# Patient Record
Sex: FEMALE | Race: WHITE | Employment: UNEMPLOYED | URBAN - METROPOLITAN AREA
[De-identification: names, ages, dates, MRNs, and addresses within clinical notes are randomized per-mention and may not be internally consistent; named-entity substitution may affect disease eponyms.]

---

## 2017-09-21 ENCOUNTER — OFFICE VISIT (OUTPATIENT)
Dept: FAMILY MEDICINE CLINIC | Age: 27
End: 2017-09-21

## 2017-09-21 VITALS
TEMPERATURE: 98.5 F | OXYGEN SATURATION: 96 % | SYSTOLIC BLOOD PRESSURE: 92 MMHG | RESPIRATION RATE: 20 BRPM | HEIGHT: 62 IN | HEART RATE: 65 BPM | DIASTOLIC BLOOD PRESSURE: 64 MMHG | WEIGHT: 123.8 LBS | BODY MASS INDEX: 22.78 KG/M2

## 2017-09-21 DIAGNOSIS — Z86.19 H/O COLD SORES: ICD-10-CM

## 2017-09-21 DIAGNOSIS — Z51.81 THERAPEUTIC DRUG MONITORING: ICD-10-CM

## 2017-09-21 DIAGNOSIS — Z00.00 WELL ADULT HEALTH CHECK: Primary | ICD-10-CM

## 2017-09-21 DIAGNOSIS — Z72.0 TOBACCO ABUSE: ICD-10-CM

## 2017-09-21 PROCEDURE — 99385 PREV VISIT NEW AGE 18-39: CPT | Performed by: FAMILY MEDICINE

## 2017-09-21 RX ORDER — VALACYCLOVIR HYDROCHLORIDE 500 MG/1
500 TABLET, FILM COATED ORAL 2 TIMES DAILY
Qty: 60 TABLET | Refills: 2 | Status: SHIPPED | OUTPATIENT
Start: 2017-09-21 | End: 2018-05-03 | Stop reason: SDUPTHER

## 2017-09-21 ASSESSMENT — PATIENT HEALTH QUESTIONNAIRE - PHQ9
5. POOR APPETITE OR OVEREATING: 2
SUM OF ALL RESPONSES TO PHQ9 QUESTIONS 1 & 2: 4
1. LITTLE INTEREST OR PLEASURE IN DOING THINGS: 2
2. FEELING DOWN, DEPRESSED OR HOPELESS: 2
4. FEELING TIRED OR HAVING LITTLE ENERGY: 2
10. IF YOU CHECKED OFF ANY PROBLEMS, HOW DIFFICULT HAVE THESE PROBLEMS MADE IT FOR YOU TO DO YOUR WORK, TAKE CARE OF THINGS AT HOME, OR GET ALONG WITH OTHER PEOPLE: 3
3. TROUBLE FALLING OR STAYING ASLEEP: 0
7. TROUBLE CONCENTRATING ON THINGS, SUCH AS READING THE NEWSPAPER OR WATCHING TELEVISION: 2
SUM OF ALL RESPONSES TO PHQ QUESTIONS 1-9: 14
6. FEELING BAD ABOUT YOURSELF - OR THAT YOU ARE A FAILURE OR HAVE LET YOURSELF OR YOUR FAMILY DOWN: 2
8. MOVING OR SPEAKING SO SLOWLY THAT OTHER PEOPLE COULD HAVE NOTICED. OR THE OPPOSITE, BEING SO FIGETY OR RESTLESS THAT YOU HAVE BEEN MOVING AROUND A LOT MORE THAN USUAL: 2
9. THOUGHTS THAT YOU WOULD BE BETTER OFF DEAD, OR OF HURTING YOURSELF: 0

## 2017-09-21 ASSESSMENT — ENCOUNTER SYMPTOMS
CONSTIPATION: 0
COUGH: 0
DIARRHEA: 0
SHORTNESS OF BREATH: 0

## 2018-05-03 ENCOUNTER — OFFICE VISIT (OUTPATIENT)
Dept: FAMILY MEDICINE CLINIC | Age: 28
End: 2018-05-03

## 2018-05-03 VITALS
SYSTOLIC BLOOD PRESSURE: 108 MMHG | HEART RATE: 84 BPM | TEMPERATURE: 98.4 F | WEIGHT: 119.4 LBS | OXYGEN SATURATION: 98 % | DIASTOLIC BLOOD PRESSURE: 68 MMHG | RESPIRATION RATE: 18 BRPM | BODY MASS INDEX: 21.97 KG/M2 | HEIGHT: 62 IN

## 2018-05-03 DIAGNOSIS — J02.9 SORE THROAT: ICD-10-CM

## 2018-05-03 DIAGNOSIS — Z86.19 H/O COLD SORES: ICD-10-CM

## 2018-05-03 DIAGNOSIS — J02.9 ACUTE VIRAL PHARYNGITIS: Primary | ICD-10-CM

## 2018-05-03 LAB — S PYO AG THROAT QL: NORMAL

## 2018-05-03 PROCEDURE — 99213 OFFICE O/P EST LOW 20 MIN: CPT | Performed by: FAMILY MEDICINE

## 2018-05-03 PROCEDURE — 87880 STREP A ASSAY W/OPTIC: CPT | Performed by: FAMILY MEDICINE

## 2018-05-03 RX ORDER — QUETIAPINE FUMARATE 50 MG/1
50 TABLET, FILM COATED ORAL 2 TIMES DAILY
COMMUNITY
End: 2018-06-20 | Stop reason: SDUPTHER

## 2018-05-03 RX ORDER — VALACYCLOVIR HYDROCHLORIDE 500 MG/1
500 TABLET, FILM COATED ORAL DAILY
Qty: 90 TABLET | Refills: 3 | Status: SHIPPED | OUTPATIENT
Start: 2018-05-03 | End: 2018-06-20 | Stop reason: SDUPTHER

## 2018-05-03 ASSESSMENT — ENCOUNTER SYMPTOMS
SHORTNESS OF BREATH: 0
RHINORRHEA: 1
SORE THROAT: 1
COUGH: 0

## 2018-05-06 LAB — THROAT CULTURE: NORMAL

## 2018-05-07 LAB
C TRACH DNA GENITAL QL NAA+PROBE: NEGATIVE
N. GONORRHOEAE DNA: NEGATIVE

## 2018-05-10 DIAGNOSIS — J02.0 ACUTE STREPTOCOCCAL PHARYNGITIS: Primary | ICD-10-CM

## 2018-05-10 RX ORDER — AMOXICILLIN 500 MG/1
500 CAPSULE ORAL 3 TIMES DAILY
Qty: 21 CAPSULE | Refills: 0 | Status: SHIPPED | OUTPATIENT
Start: 2018-05-10 | End: 2018-05-17

## 2018-06-15 ENCOUNTER — TELEPHONE (OUTPATIENT)
Dept: FAMILY MEDICINE CLINIC | Age: 28
End: 2018-06-15

## 2018-06-15 NOTE — TELEPHONE ENCOUNTER
Is there a reason her Psychiatrist will not refill the medication? I can refill to hold her over if she needs it but would like to see her in clinic - have her bring meds with her so we can confirm what she's on. Thanks.

## 2018-06-18 NOTE — TELEPHONE ENCOUNTER
Called the [address] [place] [address],[address] ([phone]) to speak c pt's therapist Renzo Selby, but she was out of the office. I left her a VM to give me a call back to confirm medications as the pt will be in this Wed. To see Dr. Cori Thomas.

## 2018-06-18 NOTE — TELEPHONE ENCOUNTER
Scheduled pt to come in to see you 06/20/18. I will call the [facility] to speak with her therapist in regards to her medications she was on. Pt stated she does not have any of the bottles.

## 2018-06-20 ENCOUNTER — OFFICE VISIT (OUTPATIENT)
Dept: FAMILY MEDICINE CLINIC | Age: 28
End: 2018-06-20

## 2018-06-20 VITALS
HEART RATE: 57 BPM | OXYGEN SATURATION: 99 % | HEIGHT: 62 IN | BODY MASS INDEX: 21.16 KG/M2 | TEMPERATURE: 98.1 F | DIASTOLIC BLOOD PRESSURE: 60 MMHG | RESPIRATION RATE: 14 BRPM | WEIGHT: 115 LBS | SYSTOLIC BLOOD PRESSURE: 106 MMHG

## 2018-06-20 DIAGNOSIS — F32.A ANXIETY AND DEPRESSION: Primary | ICD-10-CM

## 2018-06-20 DIAGNOSIS — F41.9 ANXIETY AND DEPRESSION: Primary | ICD-10-CM

## 2018-06-20 DIAGNOSIS — Z86.19 H/O COLD SORES: ICD-10-CM

## 2018-06-20 PROCEDURE — G8427 DOCREV CUR MEDS BY ELIG CLIN: HCPCS | Performed by: FAMILY MEDICINE

## 2018-06-20 PROCEDURE — 99213 OFFICE O/P EST LOW 20 MIN: CPT | Performed by: FAMILY MEDICINE

## 2018-06-20 PROCEDURE — G8420 CALC BMI NORM PARAMETERS: HCPCS | Performed by: FAMILY MEDICINE

## 2018-06-20 PROCEDURE — 4004F PT TOBACCO SCREEN RCVD TLK: CPT | Performed by: FAMILY MEDICINE

## 2018-06-20 RX ORDER — CLONIDINE HYDROCHLORIDE 0.1 MG/1
0.1 TABLET ORAL 2 TIMES DAILY
Qty: 60 TABLET | Refills: 2 | Status: SHIPPED | OUTPATIENT
Start: 2018-06-20 | End: 2018-09-19 | Stop reason: SDUPTHER

## 2018-06-20 RX ORDER — QUETIAPINE FUMARATE 50 MG/1
50 TABLET, FILM COATED ORAL NIGHTLY
Qty: 30 TABLET | Refills: 2 | Status: SHIPPED | OUTPATIENT
Start: 2018-06-20 | End: 2018-06-20 | Stop reason: SDUPTHER

## 2018-06-20 RX ORDER — VALACYCLOVIR HYDROCHLORIDE 500 MG/1
500 TABLET, FILM COATED ORAL DAILY
Qty: 90 TABLET | Refills: 3 | Status: SHIPPED | OUTPATIENT
Start: 2018-06-20 | End: 2019-07-12 | Stop reason: SDUPTHER

## 2018-06-20 RX ORDER — CLONIDINE HYDROCHLORIDE 0.1 MG/1
0.1 TABLET ORAL 2 TIMES DAILY
Qty: 60 TABLET | Refills: 2 | Status: SHIPPED | OUTPATIENT
Start: 2018-06-20 | End: 2018-06-20 | Stop reason: SDUPTHER

## 2018-06-20 RX ORDER — QUETIAPINE FUMARATE 50 MG/1
50 TABLET, FILM COATED ORAL NIGHTLY
Qty: 30 TABLET | Refills: 2 | Status: SHIPPED | OUTPATIENT
Start: 2018-06-20 | End: 2018-12-25 | Stop reason: SDUPTHER

## 2018-06-20 ASSESSMENT — ENCOUNTER SYMPTOMS
SHORTNESS OF BREATH: 0
COUGH: 0

## 2019-02-17 ENCOUNTER — APPOINTMENT (OUTPATIENT)
Dept: GENERAL RADIOLOGY | Age: 29
End: 2019-02-17
Payer: COMMERCIAL

## 2019-02-17 ENCOUNTER — HOSPITAL ENCOUNTER (EMERGENCY)
Age: 29
Discharge: HOME | End: 2019-02-17
Payer: COMMERCIAL

## 2019-02-17 VITALS
RESPIRATION RATE: 16 BRPM | WEIGHT: 115 LBS | HEIGHT: 62 IN | HEART RATE: 60 BPM | BODY MASS INDEX: 21.16 KG/M2 | OXYGEN SATURATION: 98 % | SYSTOLIC BLOOD PRESSURE: 98 MMHG | DIASTOLIC BLOOD PRESSURE: 70 MMHG | TEMPERATURE: 98.8 F

## 2019-02-17 DIAGNOSIS — S09.92XA NASAL INJURY, INITIAL ENCOUNTER: Primary | ICD-10-CM

## 2019-02-17 PROCEDURE — 70160 X-RAY EXAM OF NASAL BONES: CPT

## 2019-02-17 PROCEDURE — 99283 EMERGENCY DEPT VISIT LOW MDM: CPT

## 2019-02-17 PROCEDURE — 6370000000 HC RX 637 (ALT 250 FOR IP): Performed by: PHYSICIAN ASSISTANT

## 2019-02-17 RX ORDER — NAPROXEN 250 MG/1
500 TABLET ORAL ONCE
Status: COMPLETED | OUTPATIENT
Start: 2019-02-17 | End: 2019-02-17

## 2019-02-17 RX ORDER — NAPROXEN 500 MG/1
500 TABLET ORAL 2 TIMES DAILY PRN
Qty: 20 TABLET | Refills: 0 | Status: SHIPPED | OUTPATIENT
Start: 2019-02-17 | End: 2020-10-02 | Stop reason: ALTCHOICE

## 2019-02-17 RX ADMIN — NAPROXEN 500 MG: 250 TABLET ORAL at 15:17

## 2019-02-17 ASSESSMENT — PAIN SCALES - GENERAL
PAINLEVEL_OUTOF10: 6
PAINLEVEL_OUTOF10: 6

## 2019-02-17 ASSESSMENT — ENCOUNTER SYMPTOMS
SINUS PRESSURE: 0
NAUSEA: 0
BACK PAIN: 0
ABDOMINAL PAIN: 0
VOICE CHANGE: 0
VOMITING: 0
CHEST TIGHTNESS: 0
FACIAL SWELLING: 1
EYE PAIN: 0
SHORTNESS OF BREATH: 0
DIARRHEA: 0
SINUS PAIN: 0

## 2019-02-19 DIAGNOSIS — F32.A ANXIETY AND DEPRESSION: ICD-10-CM

## 2019-02-19 DIAGNOSIS — F41.9 ANXIETY AND DEPRESSION: ICD-10-CM

## 2019-02-19 RX ORDER — QUETIAPINE FUMARATE 50 MG/1
TABLET, FILM COATED ORAL
Qty: 30 TABLET | Refills: 1 | Status: SHIPPED | OUTPATIENT
Start: 2019-02-19 | End: 2019-07-12 | Stop reason: SDUPTHER

## 2019-02-19 RX ORDER — VORTIOXETINE 10 MG/1
TABLET, FILM COATED ORAL
Qty: 30 TABLET | Refills: 1 | Status: SHIPPED | OUTPATIENT
Start: 2019-02-19 | End: 2019-07-12 | Stop reason: SDUPTHER

## 2019-02-19 RX ORDER — CLONIDINE HYDROCHLORIDE 0.1 MG/1
TABLET ORAL
Qty: 60 TABLET | Refills: 0 | Status: SHIPPED | OUTPATIENT
Start: 2019-02-19 | End: 2019-03-18 | Stop reason: SDUPTHER

## 2019-03-18 DIAGNOSIS — F32.A ANXIETY AND DEPRESSION: ICD-10-CM

## 2019-03-18 DIAGNOSIS — F41.9 ANXIETY AND DEPRESSION: ICD-10-CM

## 2019-03-18 RX ORDER — CLONIDINE HYDROCHLORIDE 0.1 MG/1
TABLET ORAL
Qty: 60 TABLET | Refills: 0 | Status: SHIPPED | OUTPATIENT
Start: 2019-03-18 | End: 2019-11-06

## 2019-04-24 ENCOUNTER — APPOINTMENT (OUTPATIENT)
Dept: CT IMAGING | Age: 29
End: 2019-04-24
Payer: OTHER MISCELLANEOUS

## 2019-04-24 ENCOUNTER — HOSPITAL ENCOUNTER (EMERGENCY)
Age: 29
Discharge: HOME | End: 2019-04-25
Payer: OTHER MISCELLANEOUS

## 2019-04-24 ENCOUNTER — APPOINTMENT (OUTPATIENT)
Dept: GENERAL RADIOLOGY | Age: 29
End: 2019-04-24
Payer: OTHER MISCELLANEOUS
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
## 2019-04-24 DIAGNOSIS — M25.552 HIP PAIN, ACUTE, LEFT: ICD-10-CM

## 2019-04-24 LAB — HCG(URINE) PREGNANCY TEST: NEGATIVE

## 2019-04-24 PROCEDURE — 99284 EMERGENCY DEPT VISIT MOD MDM: CPT

## 2019-04-24 PROCEDURE — 6370000000 HC RX 637 (ALT 250 FOR IP): Performed by: NURSE PRACTITIONER

## 2019-04-24 PROCEDURE — 74176 CT ABD & PELVIS W/O CONTRAST: CPT

## 2019-04-24 PROCEDURE — 84703 CHORIONIC GONADOTROPIN ASSAY: CPT

## 2019-04-24 PROCEDURE — 73502 X-RAY EXAM HIP UNI 2-3 VIEWS: CPT

## 2019-04-24 RX ORDER — CYCLOBENZAPRINE HCL 10 MG
10 TABLET ORAL 3 TIMES DAILY PRN
Qty: 21 TABLET | Refills: 0 | Status: SHIPPED | OUTPATIENT
Start: 2019-04-24 | End: 2019-05-01

## 2019-04-24 RX ORDER — OXYCODONE HYDROCHLORIDE AND ACETAMINOPHEN 5; 325 MG/1; MG/1
1 TABLET ORAL ONCE
Status: COMPLETED | OUTPATIENT
Start: 2019-04-24 | End: 2019-04-24

## 2019-04-24 RX ORDER — IBUPROFEN 800 MG/1
800 TABLET ORAL EVERY 8 HOURS PRN
Qty: 30 TABLET | Refills: 0 | Status: SHIPPED | OUTPATIENT
Start: 2019-04-24 | End: 2020-10-02 | Stop reason: ALTCHOICE

## 2019-04-24 RX ADMIN — OXYCODONE HYDROCHLORIDE AND ACETAMINOPHEN 1 TABLET: 5; 325 TABLET ORAL at 22:31

## 2019-04-24 ASSESSMENT — PAIN SCALES - GENERAL
PAINLEVEL_OUTOF10: 9
PAINLEVEL_OUTOF10: 9
PAINLEVEL_OUTOF10: 4

## 2019-04-24 ASSESSMENT — ENCOUNTER SYMPTOMS
ABDOMINAL DISTENTION: 0
BACK PAIN: 0
NAUSEA: 0
VOMITING: 0
SHORTNESS OF BREATH: 0
ABDOMINAL PAIN: 0
FACIAL SWELLING: 0
COLOR CHANGE: 0

## 2019-04-24 ASSESSMENT — PAIN DESCRIPTION - ORIENTATION: ORIENTATION: LEFT

## 2019-04-24 ASSESSMENT — PAIN DESCRIPTION - PAIN TYPE: TYPE: ACUTE PAIN

## 2019-04-24 ASSESSMENT — PAIN DESCRIPTION - LOCATION: LOCATION: HIP

## 2019-04-24 ASSESSMENT — PAIN DESCRIPTION - DESCRIPTORS: DESCRIPTORS: DULL;SHOOTING

## 2019-04-25 VITALS
RESPIRATION RATE: 16 BRPM | WEIGHT: 115.3 LBS | TEMPERATURE: 97.7 F | DIASTOLIC BLOOD PRESSURE: 72 MMHG | HEART RATE: 66 BPM | HEIGHT: 62 IN | BODY MASS INDEX: 21.22 KG/M2 | SYSTOLIC BLOOD PRESSURE: 124 MMHG | OXYGEN SATURATION: 100 %

## 2019-04-25 ASSESSMENT — PAIN SCALES - GENERAL: PAINLEVEL_OUTOF10: 3
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
## 2019-07-12 ENCOUNTER — TELEPHONE (OUTPATIENT)
Dept: FAMILY MEDICINE CLINIC | Age: 29
End: 2019-07-12

## 2019-07-12 DIAGNOSIS — Z86.19 H/O COLD SORES: ICD-10-CM

## 2019-07-12 DIAGNOSIS — F41.9 ANXIETY AND DEPRESSION: ICD-10-CM

## 2019-07-12 DIAGNOSIS — F32.A ANXIETY AND DEPRESSION: ICD-10-CM

## 2019-07-12 RX ORDER — QUETIAPINE FUMARATE 50 MG/1
TABLET, FILM COATED ORAL
Qty: 30 TABLET | Refills: 1 | OUTPATIENT
Start: 2019-07-12

## 2019-07-12 RX ORDER — QUETIAPINE FUMARATE 50 MG/1
TABLET, FILM COATED ORAL
Qty: 30 TABLET | Refills: 0 | Status: SHIPPED | OUTPATIENT
Start: 2019-07-12 | End: 2019-09-16 | Stop reason: SDUPTHER

## 2019-07-12 RX ORDER — VALACYCLOVIR HYDROCHLORIDE 500 MG/1
TABLET, FILM COATED ORAL
Qty: 30 TABLET | Refills: 3 | Status: SHIPPED | OUTPATIENT
Start: 2019-07-12 | End: 2019-09-16 | Stop reason: SDUPTHER

## 2019-07-12 RX ORDER — FOLIC ACID 1 MG/1
1 TABLET ORAL DAILY
Qty: 90 TABLET | Refills: 1 | Status: SHIPPED | OUTPATIENT
Start: 2019-07-12 | End: 2019-11-06

## 2019-08-21 ENCOUNTER — TELEPHONE (OUTPATIENT)
Dept: FAMILY MEDICINE CLINIC | Age: 29
End: 2019-08-21

## 2019-08-21 DIAGNOSIS — F32.A ANXIETY AND DEPRESSION: ICD-10-CM

## 2019-08-21 DIAGNOSIS — Z86.19 H/O COLD SORES: ICD-10-CM

## 2019-08-21 DIAGNOSIS — F41.9 ANXIETY AND DEPRESSION: ICD-10-CM

## 2019-08-21 RX ORDER — CLONIDINE HYDROCHLORIDE 0.1 MG/1
TABLET ORAL
Qty: 60 TABLET | Refills: 0 | Status: CANCELLED | OUTPATIENT
Start: 2019-08-21

## 2019-08-21 RX ORDER — VALACYCLOVIR HYDROCHLORIDE 500 MG/1
TABLET, FILM COATED ORAL
Qty: 30 TABLET | Refills: 3 | Status: CANCELLED | OUTPATIENT
Start: 2019-08-21

## 2019-09-16 ENCOUNTER — OFFICE VISIT (OUTPATIENT)
Dept: FAMILY MEDICINE CLINIC | Age: 29
End: 2019-09-16
Payer: COMMERCIAL

## 2019-09-16 VITALS
OXYGEN SATURATION: 99 % | TEMPERATURE: 98 F | RESPIRATION RATE: 15 BRPM | WEIGHT: 104 LBS | SYSTOLIC BLOOD PRESSURE: 106 MMHG | BODY MASS INDEX: 19.14 KG/M2 | HEIGHT: 62 IN | DIASTOLIC BLOOD PRESSURE: 64 MMHG | HEART RATE: 72 BPM

## 2019-09-16 DIAGNOSIS — Z86.19 H/O COLD SORES: ICD-10-CM

## 2019-09-16 DIAGNOSIS — R21 SKIN ERUPTION: ICD-10-CM

## 2019-09-16 DIAGNOSIS — F32.A ANXIETY AND DEPRESSION: Primary | ICD-10-CM

## 2019-09-16 DIAGNOSIS — F41.9 ANXIETY AND DEPRESSION: Primary | ICD-10-CM

## 2019-09-16 PROCEDURE — G8427 DOCREV CUR MEDS BY ELIG CLIN: HCPCS | Performed by: FAMILY MEDICINE

## 2019-09-16 PROCEDURE — 4004F PT TOBACCO SCREEN RCVD TLK: CPT | Performed by: FAMILY MEDICINE

## 2019-09-16 PROCEDURE — 99214 OFFICE O/P EST MOD 30 MIN: CPT | Performed by: FAMILY MEDICINE

## 2019-09-16 PROCEDURE — G8420 CALC BMI NORM PARAMETERS: HCPCS | Performed by: FAMILY MEDICINE

## 2019-09-16 RX ORDER — VALACYCLOVIR HYDROCHLORIDE 500 MG/1
TABLET, FILM COATED ORAL
Qty: 30 TABLET | Refills: 3 | Status: SHIPPED | OUTPATIENT
Start: 2019-09-16 | End: 2020-04-28

## 2019-09-16 RX ORDER — QUETIAPINE FUMARATE 100 MG/1
100 TABLET, FILM COATED ORAL NIGHTLY
Qty: 30 TABLET | Refills: 5 | Status: SHIPPED | OUTPATIENT
Start: 2019-09-16 | End: 2020-01-08

## 2019-09-16 RX ORDER — TRIAMCINOLONE ACETONIDE 5 MG/G
CREAM TOPICAL
Qty: 30 G | Refills: 0 | Status: SHIPPED | OUTPATIENT
Start: 2019-09-16 | End: 2019-09-23

## 2019-09-16 ASSESSMENT — ENCOUNTER SYMPTOMS
COUGH: 0
SHORTNESS OF BREATH: 0

## 2019-11-06 ENCOUNTER — OFFICE VISIT (OUTPATIENT)
Dept: PSYCHIATRY | Age: 29
End: 2019-11-06
Payer: COMMERCIAL

## 2019-11-06 VITALS
DIASTOLIC BLOOD PRESSURE: 58 MMHG | BODY MASS INDEX: 18.84 KG/M2 | SYSTOLIC BLOOD PRESSURE: 98 MMHG | HEIGHT: 62 IN | WEIGHT: 102.4 LBS | HEART RATE: 80 BPM

## 2019-11-06 DIAGNOSIS — F19.10 SUBSTANCE ABUSE (HCC): ICD-10-CM

## 2019-11-06 DIAGNOSIS — F43.10 PTSD (POST-TRAUMATIC STRESS DISORDER): ICD-10-CM

## 2019-11-06 DIAGNOSIS — F39 MOOD DISORDER (HCC): Primary | ICD-10-CM

## 2019-11-06 DIAGNOSIS — F60.3 BORDERLINE PERSONALITY DISORDER (HCC): ICD-10-CM

## 2019-11-06 DIAGNOSIS — F41.9 ANXIETY: ICD-10-CM

## 2019-11-06 PROCEDURE — G8428 CUR MEDS NOT DOCUMENT: HCPCS | Performed by: NURSE PRACTITIONER

## 2019-11-06 PROCEDURE — 4004F PT TOBACCO SCREEN RCVD TLK: CPT | Performed by: NURSE PRACTITIONER

## 2019-11-06 PROCEDURE — G8420 CALC BMI NORM PARAMETERS: HCPCS | Performed by: NURSE PRACTITIONER

## 2019-11-06 PROCEDURE — 99204 OFFICE O/P NEW MOD 45 MIN: CPT | Performed by: NURSE PRACTITIONER

## 2019-11-06 PROCEDURE — G8484 FLU IMMUNIZE NO ADMIN: HCPCS | Performed by: NURSE PRACTITIONER

## 2019-11-06 RX ORDER — MIRTAZAPINE 15 MG/1
15 TABLET, FILM COATED ORAL NIGHTLY
Qty: 30 TABLET | Refills: 2 | Status: SHIPPED | OUTPATIENT
Start: 2019-11-06 | End: 2020-01-08

## 2019-11-06 RX ORDER — LEVOMEFOLATE/ALGAL OIL 15-90.314
15 CAPSULE ORAL DAILY
Qty: 30 CAPSULE | Refills: 2 | Status: SHIPPED | OUTPATIENT
Start: 2019-11-06 | End: 2020-03-19 | Stop reason: SDUPTHER

## 2019-11-06 RX ORDER — OLANZAPINE 2.5 MG/1
2.5 TABLET ORAL 2 TIMES DAILY PRN
Qty: 30 TABLET | Refills: 2 | Status: SHIPPED | OUTPATIENT
Start: 2019-11-06 | End: 2020-01-08

## 2019-11-06 ASSESSMENT — ANXIETY QUESTIONNAIRES
1. FEELING NERVOUS, ANXIOUS, OR ON EDGE: 3-NEARLY EVERY DAY
5. BEING SO RESTLESS THAT IT IS HARD TO SIT STILL: 0-NOT AT ALL
4. TROUBLE RELAXING: 3-NEARLY EVERY DAY
3. WORRYING TOO MUCH ABOUT DIFFERENT THINGS: 3-NEARLY EVERY DAY
6. BECOMING EASILY ANNOYED OR IRRITABLE: 3-NEARLY EVERY DAY
GAD7 TOTAL SCORE: 17
2. NOT BEING ABLE TO STOP OR CONTROL WORRYING: 3-NEARLY EVERY DAY
7. FEELING AFRAID AS IF SOMETHING AWFUL MIGHT HAPPEN: 2-OVER HALF THE DAYS

## 2019-11-06 ASSESSMENT — COLUMBIA-SUICIDE SEVERITY RATING SCALE - C-SSRS
2. HAVE YOU ACTUALLY HAD ANY THOUGHTS OF KILLING YOURSELF?: NO
6. HAVE YOU EVER DONE ANYTHING, STARTED TO DO ANYTHING, OR PREPARED TO DO ANYTHING TO END YOUR LIFE?: NO
1. WITHIN THE PAST MONTH, HAVE YOU WISHED YOU WERE DEAD OR WISHED YOU COULD GO TO SLEEP AND NOT WAKE UP?: NO

## 2019-11-06 ASSESSMENT — PATIENT HEALTH QUESTIONNAIRE - PHQ9
8. MOVING OR SPEAKING SO SLOWLY THAT OTHER PEOPLE COULD HAVE NOTICED. OR THE OPPOSITE, BEING SO FIGETY OR RESTLESS THAT YOU HAVE BEEN MOVING AROUND A LOT MORE THAN USUAL: 0
6. FEELING BAD ABOUT YOURSELF - OR THAT YOU ARE A FAILURE OR HAVE LET YOURSELF OR YOUR FAMILY DOWN: 3
SUM OF ALL RESPONSES TO PHQ9 QUESTIONS 1 & 2: 6
4. FEELING TIRED OR HAVING LITTLE ENERGY: 3
SUM OF ALL RESPONSES TO PHQ QUESTIONS 1-9: 21
SUM OF ALL RESPONSES TO PHQ QUESTIONS 1-9: 21
7. TROUBLE CONCENTRATING ON THINGS, SUCH AS READING THE NEWSPAPER OR WATCHING TELEVISION: 3
1. LITTLE INTEREST OR PLEASURE IN DOING THINGS: 3
10. IF YOU CHECKED OFF ANY PROBLEMS, HOW DIFFICULT HAVE THESE PROBLEMS MADE IT FOR YOU TO DO YOUR WORK, TAKE CARE OF THINGS AT HOME, OR GET ALONG WITH OTHER PEOPLE: 1
3. TROUBLE FALLING OR STAYING ASLEEP: 3
5. POOR APPETITE OR OVEREATING: 3
2. FEELING DOWN, DEPRESSED OR HOPELESS: 3
9. THOUGHTS THAT YOU WOULD BE BETTER OFF DEAD, OR OF HURTING YOURSELF: 0

## 2020-01-08 ENCOUNTER — OFFICE VISIT (OUTPATIENT)
Dept: PSYCHIATRY | Age: 30
End: 2020-01-08
Payer: COMMERCIAL

## 2020-01-08 VITALS
SYSTOLIC BLOOD PRESSURE: 88 MMHG | BODY MASS INDEX: 18.81 KG/M2 | HEART RATE: 65 BPM | HEIGHT: 62 IN | DIASTOLIC BLOOD PRESSURE: 58 MMHG | WEIGHT: 102.2 LBS

## 2020-01-08 PROCEDURE — G8484 FLU IMMUNIZE NO ADMIN: HCPCS | Performed by: NURSE PRACTITIONER

## 2020-01-08 PROCEDURE — G8420 CALC BMI NORM PARAMETERS: HCPCS | Performed by: NURSE PRACTITIONER

## 2020-01-08 PROCEDURE — 4004F PT TOBACCO SCREEN RCVD TLK: CPT | Performed by: NURSE PRACTITIONER

## 2020-01-08 PROCEDURE — 99214 OFFICE O/P EST MOD 30 MIN: CPT | Performed by: NURSE PRACTITIONER

## 2020-01-08 PROCEDURE — G8427 DOCREV CUR MEDS BY ELIG CLIN: HCPCS | Performed by: NURSE PRACTITIONER

## 2020-01-08 RX ORDER — QUETIAPINE FUMARATE 200 MG/1
200 TABLET, FILM COATED ORAL NIGHTLY
Qty: 30 TABLET | Refills: 3 | Status: SHIPPED | OUTPATIENT
Start: 2020-01-08 | End: 2020-07-06

## 2020-01-08 RX ORDER — OLANZAPINE 2.5 MG/1
2.5 TABLET ORAL 3 TIMES DAILY PRN
Qty: 90 TABLET | Refills: 2 | Status: SHIPPED | OUTPATIENT
Start: 2020-01-08 | End: 2020-04-02

## 2020-01-08 RX ORDER — MIRTAZAPINE 30 MG/1
30 TABLET, FILM COATED ORAL NIGHTLY
Qty: 30 TABLET | Refills: 3 | Status: SHIPPED | OUTPATIENT
Start: 2020-01-08 | End: 2020-07-06

## 2020-01-08 ASSESSMENT — PATIENT HEALTH QUESTIONNAIRE - PHQ9
SUM OF ALL RESPONSES TO PHQ9 QUESTIONS 1 & 2: 6
SUM OF ALL RESPONSES TO PHQ QUESTIONS 1-9: 19
3. TROUBLE FALLING OR STAYING ASLEEP: 3
8. MOVING OR SPEAKING SO SLOWLY THAT OTHER PEOPLE COULD HAVE NOTICED. OR THE OPPOSITE, BEING SO FIGETY OR RESTLESS THAT YOU HAVE BEEN MOVING AROUND A LOT MORE THAN USUAL: 0
2. FEELING DOWN, DEPRESSED OR HOPELESS: 3
6. FEELING BAD ABOUT YOURSELF - OR THAT YOU ARE A FAILURE OR HAVE LET YOURSELF OR YOUR FAMILY DOWN: 2
10. IF YOU CHECKED OFF ANY PROBLEMS, HOW DIFFICULT HAVE THESE PROBLEMS MADE IT FOR YOU TO DO YOUR WORK, TAKE CARE OF THINGS AT HOME, OR GET ALONG WITH OTHER PEOPLE: 1
4. FEELING TIRED OR HAVING LITTLE ENERGY: 3
9. THOUGHTS THAT YOU WOULD BE BETTER OFF DEAD, OR OF HURTING YOURSELF: 0
7. TROUBLE CONCENTRATING ON THINGS, SUCH AS READING THE NEWSPAPER OR WATCHING TELEVISION: 2
5. POOR APPETITE OR OVEREATING: 3
1. LITTLE INTEREST OR PLEASURE IN DOING THINGS: 3
SUM OF ALL RESPONSES TO PHQ QUESTIONS 1-9: 19

## 2020-01-08 ASSESSMENT — ANXIETY QUESTIONNAIRES
2. NOT BEING ABLE TO STOP OR CONTROL WORRYING: 3-NEARLY EVERY DAY
6. BECOMING EASILY ANNOYED OR IRRITABLE: 3-NEARLY EVERY DAY
7. FEELING AFRAID AS IF SOMETHING AWFUL MIGHT HAPPEN: 3-NEARLY EVERY DAY
4. TROUBLE RELAXING: 3-NEARLY EVERY DAY
GAD7 TOTAL SCORE: 21
5. BEING SO RESTLESS THAT IT IS HARD TO SIT STILL: 3-NEARLY EVERY DAY
1. FEELING NERVOUS, ANXIOUS, OR ON EDGE: 3-NEARLY EVERY DAY
3. WORRYING TOO MUCH ABOUT DIFFERENT THINGS: 3-NEARLY EVERY DAY
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
## 2020-03-16 ENCOUNTER — PATIENT MESSAGE (OUTPATIENT)
Dept: FAMILY MEDICINE CLINIC | Age: 30
End: 2020-03-16

## 2020-03-19 RX ORDER — LEVOMEFOLATE/ALGAL OIL 15-90.314
15 CAPSULE ORAL DAILY
Qty: 30 CAPSULE | Refills: 2 | Status: SHIPPED | OUTPATIENT
Start: 2020-03-19 | End: 2020-03-20

## 2020-03-20 RX ORDER — LEVOMEFOLATE CALCIUM 15 MG
15 TABLET ORAL DAILY
Qty: 30 TABLET | Refills: 3 | Status: SHIPPED | OUTPATIENT
Start: 2020-03-20 | End: 2021-01-05

## 2020-04-01 ENCOUNTER — TELEPHONE (OUTPATIENT)
Dept: FAMILY MEDICINE CLINIC | Age: 30
End: 2020-04-01

## 2020-04-02 RX ORDER — OLANZAPINE 2.5 MG/1
2.5 TABLET ORAL 3 TIMES DAILY
Qty: 90 TABLET | Refills: 2 | Status: SHIPPED | OUTPATIENT
Start: 2020-04-02 | End: 2020-09-05 | Stop reason: SDUPTHER

## 2020-04-28 RX ORDER — VALACYCLOVIR HYDROCHLORIDE 500 MG/1
TABLET, FILM COATED ORAL
Qty: 30 TABLET | Refills: 2 | Status: SHIPPED | OUTPATIENT
Start: 2020-04-28 | End: 2020-10-20

## 2020-07-06 RX ORDER — QUETIAPINE FUMARATE 200 MG/1
TABLET, FILM COATED ORAL
Qty: 30 TABLET | Refills: 3 | Status: SHIPPED | OUTPATIENT
Start: 2020-07-06 | End: 2020-09-14

## 2020-07-06 RX ORDER — MIRTAZAPINE 30 MG/1
TABLET, FILM COATED ORAL
Qty: 30 TABLET | Refills: 3 | Status: SHIPPED | OUTPATIENT
Start: 2020-07-06 | End: 2021-01-05

## 2020-09-04 ENCOUNTER — NURSE TRIAGE (OUTPATIENT)
Dept: OTHER | Facility: CLINIC | Age: 30
End: 2020-09-04

## 2020-09-04 ENCOUNTER — TELEPHONE (OUTPATIENT)
Dept: FAMILY MEDICINE CLINIC | Age: 30
End: 2020-09-04

## 2020-09-04 NOTE — TELEPHONE ENCOUNTER
Reason for Disposition   MODERATE eye pain (e.g., interferes with normal activities)    Answer Assessment - Initial Assessment Questions  1. EYE DISCHARGE: \"Is the discharge in one or both eyes? \" \"What color is it? \" \"How much is there? \" \"When did the discharge start? \"       Right eye is worst than left eye, pt states discharge is a green color  2. REDNESsF SCLERA: \"Is the redness in one or both eyes? \" \"When did the redness start? \"       Right eye is very red  3. EYELIDS: \"Are the eyelids red or swollen? \" If so, ask: \"How much? \"       Yes, on Right side  4. VISION: \"Is there any difficulty seeing clearly? \"   Pt states vision is causing a little trouble     5. PAIN: \"Is there any pain? If so, ask: \"How bad is it? \" (Scale 1-10; or mild, moderate, severe)     - MILD (1-3): doesn't interfere with normal activities      - MODERATE (4-7): interferes with normal activities or awakens from sleep     - SEVERE (8-10): excruciating pain, unable to do any normal activities        Moderate  6. CONTACT LENS: \"Do you wear contacts? \"      Pt does not   7. OTHER SYMPTOMS: \"Do you have any other symptoms? \" (e.g., fever, runny nose, cough)  None     8. PREGNANCY: \"Is there any chance you are pregnant? \" \"When was your last menstrual period? \"      No    Protocols used: EYE - PUS OR DISCHARGE-ADULT-OH

## 2020-09-04 NOTE — TELEPHONE ENCOUNTER
Pt will follow care advise . Pt understands that if scheduling is unable to get her in today that she needs to follow up with [facility] or ED. Please do not respond to the triage nurse through this encounter. Any subsequent communication should be directly with the patient.

## 2020-09-09 ENCOUNTER — OFFICE VISIT (OUTPATIENT)
Dept: FAMILY MEDICINE CLINIC | Age: 30
End: 2020-09-09
Payer: COMMERCIAL

## 2020-09-09 VITALS
WEIGHT: 96 LBS | SYSTOLIC BLOOD PRESSURE: 98 MMHG | DIASTOLIC BLOOD PRESSURE: 64 MMHG | BODY MASS INDEX: 17.56 KG/M2 | OXYGEN SATURATION: 97 % | HEART RATE: 71 BPM

## 2020-09-09 DIAGNOSIS — R56.9 SEIZURE-LIKE ACTIVITY (HCC): ICD-10-CM

## 2020-09-09 DIAGNOSIS — R55 SYNCOPE, UNSPECIFIED SYNCOPE TYPE: ICD-10-CM

## 2020-09-09 LAB
A/G RATIO: 2.3 (ref 1.1–2.2)
ALBUMIN SERPL-MCNC: 4.6 G/DL (ref 3.4–5)
ALP BLD-CCNC: 80 U/L (ref 40–129)
ALT SERPL-CCNC: 11 U/L (ref 10–40)
ANION GAP SERPL CALCULATED.3IONS-SCNC: 12 MMOL/L (ref 3–16)
AST SERPL-CCNC: 20 U/L (ref 15–37)
BASOPHILS ABSOLUTE: 0 K/UL (ref 0–0.2)
BASOPHILS RELATIVE PERCENT: 0.5 %
BILIRUB SERPL-MCNC: <0.2 MG/DL (ref 0–1)
BUN BLDV-MCNC: 6 MG/DL (ref 7–20)
CALCIUM SERPL-MCNC: 9.6 MG/DL (ref 8.3–10.6)
CHLORIDE BLD-SCNC: 104 MMOL/L (ref 99–110)
CO2: 28 MMOL/L (ref 21–32)
CREAT SERPL-MCNC: 0.5 MG/DL (ref 0.6–1.1)
EOSINOPHILS ABSOLUTE: 0.1 K/UL (ref 0–0.6)
EOSINOPHILS RELATIVE PERCENT: 1 %
FERRITIN: 86.4 NG/ML (ref 15–150)
FOLATE: 14.72 NG/ML (ref 4.78–24.2)
GFR AFRICAN AMERICAN: >60
GFR NON-AFRICAN AMERICAN: >60
GLOBULIN: 2 G/DL
GLUCOSE BLD-MCNC: 81 MG/DL (ref 70–99)
HCT VFR BLD CALC: 37.4 % (ref 36–48)
HEMOGLOBIN: 12.8 G/DL (ref 12–16)
LYMPHOCYTES ABSOLUTE: 2.7 K/UL (ref 1–5.1)
LYMPHOCYTES RELATIVE PERCENT: 29.9 %
MCH RBC QN AUTO: 31.8 PG (ref 26–34)
MCHC RBC AUTO-ENTMCNC: 34.1 G/DL (ref 31–36)
MCV RBC AUTO: 93.2 FL (ref 80–100)
MONOCYTES ABSOLUTE: 0.3 K/UL (ref 0–1.3)
MONOCYTES RELATIVE PERCENT: 2.9 %
NEUTROPHILS ABSOLUTE: 5.9 K/UL (ref 1.7–7.7)
NEUTROPHILS RELATIVE PERCENT: 65.7 %
PDW BLD-RTO: 13.1 % (ref 12.4–15.4)
PLATELET # BLD: 216 K/UL (ref 135–450)
PMV BLD AUTO: 7.8 FL (ref 5–10.5)
POTASSIUM SERPL-SCNC: 3.9 MMOL/L (ref 3.5–5.1)
RBC # BLD: 4.01 M/UL (ref 4–5.2)
SODIUM BLD-SCNC: 144 MMOL/L (ref 136–145)
TOTAL PROTEIN: 6.6 G/DL (ref 6.4–8.2)
TSH SERPL DL<=0.05 MIU/L-ACNC: 1.48 UIU/ML (ref 0.27–4.2)
VITAMIN B-12: 663 PG/ML (ref 211–911)
VITAMIN D 25-HYDROXY: 27.8 NG/ML
WBC # BLD: 9 K/UL (ref 4–11)

## 2020-09-09 PROCEDURE — 93000 ELECTROCARDIOGRAM COMPLETE: CPT | Performed by: FAMILY MEDICINE

## 2020-09-09 PROCEDURE — 4004F PT TOBACCO SCREEN RCVD TLK: CPT | Performed by: FAMILY MEDICINE

## 2020-09-09 PROCEDURE — 99214 OFFICE O/P EST MOD 30 MIN: CPT | Performed by: FAMILY MEDICINE

## 2020-09-09 PROCEDURE — G8419 CALC BMI OUT NRM PARAM NOF/U: HCPCS | Performed by: FAMILY MEDICINE

## 2020-09-09 PROCEDURE — G8427 DOCREV CUR MEDS BY ELIG CLIN: HCPCS | Performed by: FAMILY MEDICINE

## 2020-09-09 RX ORDER — POLYMYXIN B SULFATE AND TRIMETHOPRIM 1; 10000 MG/ML; [USP'U]/ML
2 SOLUTION OPHTHALMIC EVERY 8 HOURS
Qty: 1 BOTTLE | Refills: 0 | Status: SHIPPED | OUTPATIENT
Start: 2020-09-09 | End: 2020-09-16

## 2020-09-09 NOTE — PROGRESS NOTES
9/9/2020    This is a 26 y.o. female   Chief Complaint   Patient presents with    Seizures     the past 6 months getting worse, falling out, vision going blank and dizzy    Other     pink eye been using eye drops from someone else. red puffy, matted shut, discharge right eye worse, left eye little      HPI  Pt with concerns for seizures  - she reports a hx of this intermittently for the past 10 years. Over the past 6 months, she reports they have been occurring more frequently. - has had about 2-3 of these types of spells in the past month or so  - she describes spells of \"passing out\" sometimes, but other times remains conscious. Other times the spells will come on slowly. Sometimes sees stars or a change in color beforehand. She has been told she has \"twitching and convulsing\" during these spells. She denies incontinence during an episode. She typically does not have confusion afterward. Low weight  - has continued to lose weight recently. Reports loss of appetite. Body mass index is 17.56 kg/m². she reports negative HIV and hep C screening recently    Hx of drug use  - she is currently on suboxone therapy and started this about 5 months ago (going to [facility]). Sees Sri REEDER. She reports she also has her medical marijuana card. - has also been using xanax off the street to self-treat her anxiety. She reports earlier this year she lost her best friend from childhood to a drug overdose. She has been struggling since.     Review of Systems   As per HPI, otherwise negative    Past Medical History:   Diagnosis Date    Anxiety     Bipolar 1 disorder ([address]     Depression     Insomnia        Past Surgical History:   Procedure Laterality Date    BREAST SURGERY      benign lump right breast    EYE SURGERY      obstructed tearducts    TUMOR REMOVAL      right chest       Family History   Problem Relation Age of Onset    Diabetes Maternal Grandmother     Cancer Maternal Grandmother     Breast Cancer Neg Hx     Colon Cancer Neg Hx        Current Outpatient Medications   Medication Sig Dispense Refill    VORTIoxetine HBr (TRINTELLIX) 20 MG TABS tablet TAKE 1 TABLET BY MOUTH DAILY 30 tablet 0    OLANZapine (ZYPREXA) 2.5 MG tablet Take 1 tablet by mouth 3 times daily 90 tablet 0    trimethoprim-polymyxin b (POLYTRIM) 31492-8.1 UNIT/ML-% ophthalmic solution Place 2 drops into both eyes every 8 hours for 7 days 1 Bottle 0    mirtazapine (REMERON) 30 MG tablet TAKE 1 TABLET BY MOUTH EVERY DAY AT NIGHT 30 tablet 3    QUEtiapine (SEROQUEL) 200 MG tablet TAKE 1 TABLET BY MOUTH EVERY DAY AT NIGHT 30 tablet 3    valACYclovir (VALTREX) 500 MG tablet TAKE 1 TABLET BY MOUTH EVERY DAY 30 tablet 2    L-Methylfolate 15 MG TABS Take 15 mg by mouth daily 30 tablet 3    DiphenhydrAMINE HCl (BENADRYL ALLERGY PO) Take by mouth      ibuprofen (ADVIL;MOTRIN) 800 MG tablet Take 1 tablet by mouth every 8 hours as needed for Pain (Patient not taking: Reported on 1/8/2020) 30 tablet 0    naproxen (NAPROSYN) 500 MG tablet Take 1 tablet by mouth 2 times daily as needed for Pain 20 tablet 0     No current facility-administered medications for this visit. BP 98/64   Pulse 71   Wt 96 lb (43.5 kg)   LMP  (LMP Unknown)   SpO2 97%   BMI 17.56 kg/m²     Physical Exam  Constitutional:       Comments: Drowsy and sleepy   Cardiovascular:      Rate and Rhythm: Normal rate and regular rhythm. Pulses: Normal pulses. Heart sounds: No murmur. Pulmonary:      Effort: Pulmonary effort is normal.      Breath sounds: Normal breath sounds. No wheezing or rales. Wt Readings from Last 3 Encounters:   09/09/20 96 lb (43.5 kg)   01/08/20 102 lb 3.2 oz (46.4 kg)   11/06/19 102 lb 6.4 oz (46.4 kg)       BP Readings from Last 3 Encounters:   09/09/20 98/64   01/08/20 (!) 88/58   11/06/19 (!) 98/58         Assessment/Plan:  1. Seizure-like activity (HCC)  - CBC Auto Differential; Future  - Folate;  Future  - TSH without Reflex; Future  - Vitamin B12; Future  - Vitamin D 25 Hydroxy; Future  - Comprehensive Metabolic Panel; Future  - FERRITIN; Future  - EKG 12 Lead  - External Referral To Neurology    2. Syncope, unspecified syncope type  - CBC Auto Differential; Future    3. Low weight    4. Anxiety      Gonsalo has no concerning findings on her EKG. She reports possible syncope and seizure-like activity. The interview was difficult today as she appears drowsy and frequently falls asleep in between questions. We will perform work-up as above for her concerns. There is a possibility that her symptoms are related to her recent anxiety. I do have concerns for her weight and weight loss and we discussed this in detail. We discussed the use of only using medications that are prescribed to her.     F/u in 2 months

## 2020-09-09 NOTE — Clinical Note
Pako Lockhart,  I saw Eliecer Hercules recently for concerns for seizure like activity. My suspicion is that they are likely pseudoseizures related to her stress and anxiety but I did express concerns for her weight loss and use of non-prescribed medications. We are doing a bit of a workup for other causes. Just wanted to keep you in the loop. Thanks for assisting in her care.     Sincerely,  Jesus Fabian

## 2020-09-14 RX ORDER — OLANZAPINE 5 MG/1
5 TABLET ORAL NIGHTLY
Qty: 30 TABLET | Refills: 0 | Status: SHIPPED | OUTPATIENT
Start: 2020-09-14 | End: 2021-01-05

## 2020-09-15 ENCOUNTER — TELEPHONE (OUTPATIENT)
Dept: PRIMARY CARE CLINIC | Age: 30
End: 2020-09-15

## 2020-09-15 NOTE — TELEPHONE ENCOUNTER
Patient is struggling with Heroin and Xanax. She is not taking the Xanax as prescribed and it is not lasting longer than a week. Patient's partner is very concerned about Kya drug use.    Please advise

## 2020-09-16 ENCOUNTER — OFFICE VISIT (OUTPATIENT)
Dept: PSYCHIATRY | Age: 30
End: 2020-09-16
Payer: COMMERCIAL

## 2020-09-16 PROCEDURE — 99443 PR PHYS/QHP TELEPHONE EVALUATION 21-30 MIN: CPT | Performed by: NURSE PRACTITIONER

## 2020-09-16 RX ORDER — GABAPENTIN 300 MG/1
300 CAPSULE ORAL NIGHTLY
Qty: 30 CAPSULE | Refills: 2 | Status: SHIPPED | OUTPATIENT
Start: 2020-09-16 | End: 2021-01-05
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
## 2020-09-16 NOTE — TELEPHONE ENCOUNTER
Pt scheduled for f/u today. Likely needs inpatient Rehab for detox.   Will discuss during scheduled f/u today

## 2020-10-02 ENCOUNTER — APPOINTMENT (OUTPATIENT)
Dept: GENERAL RADIOLOGY | Age: 30
End: 2020-10-02
Payer: COMMERCIAL

## 2020-10-02 ENCOUNTER — HOSPITAL ENCOUNTER (EMERGENCY)
Age: 30
Discharge: HOME | End: 2020-10-02
Attending: EMERGENCY MEDICINE
Payer: COMMERCIAL

## 2020-10-02 VITALS
DIASTOLIC BLOOD PRESSURE: 81 MMHG | RESPIRATION RATE: 18 BRPM | SYSTOLIC BLOOD PRESSURE: 106 MMHG | BODY MASS INDEX: 19.23 KG/M2 | WEIGHT: 104.5 LBS | HEART RATE: 69 BPM | HEIGHT: 62 IN | TEMPERATURE: 98.4 F | OXYGEN SATURATION: 99 %

## 2020-10-02 LAB
A/G RATIO: 1.5 (ref 1.1–2.2)
ALBUMIN SERPL-MCNC: 4 G/DL (ref 3.4–5)
ALP BLD-CCNC: 71 U/L (ref 40–129)
ALT SERPL-CCNC: 13 U/L (ref 10–40)
ANION GAP SERPL CALCULATED.3IONS-SCNC: 12 MMOL/L (ref 3–16)
AST SERPL-CCNC: 16 U/L (ref 15–37)
BASOPHILS ABSOLUTE: 0 K/UL (ref 0–0.2)
BASOPHILS RELATIVE PERCENT: 0.3 %
BILIRUB SERPL-MCNC: <0.2 MG/DL (ref 0–1)
BUN BLDV-MCNC: 7 MG/DL (ref 7–20)
CALCIUM SERPL-MCNC: 9.1 MG/DL (ref 8.3–10.6)
CHLORIDE BLD-SCNC: 103 MMOL/L (ref 99–110)
CO2: 25 MMOL/L (ref 21–32)
CREAT SERPL-MCNC: <0.5 MG/DL (ref 0.6–1.1)
EKG ATRIAL RATE: 80 BPM
EKG DIAGNOSIS: NORMAL
EKG P AXIS: 71 DEGREES
EKG P-R INTERVAL: 110 MS
EKG Q-T INTERVAL: 380 MS
EKG QRS DURATION: 82 MS
EKG QTC CALCULATION (BAZETT): 438 MS
EKG R AXIS: 85 DEGREES
EKG T AXIS: 45 DEGREES
EKG VENTRICULAR RATE: 80 BPM
EOSINOPHILS ABSOLUTE: 0 K/UL (ref 0–0.6)
EOSINOPHILS RELATIVE PERCENT: 0.4 %
GFR AFRICAN AMERICAN: >60
GFR NON-AFRICAN AMERICAN: >60
GLOBULIN: 2.6 G/DL
GLUCOSE BLD-MCNC: 111 MG/DL (ref 70–99)
HCT VFR BLD CALC: 35.7 % (ref 36–48)
HEMOGLOBIN: 12.3 G/DL (ref 12–16)
LIPASE: 18 U/L (ref 13–60)
LYMPHOCYTES ABSOLUTE: 1.2 K/UL (ref 1–5.1)
LYMPHOCYTES RELATIVE PERCENT: 15.4 %
MAGNESIUM: 1.7 MG/DL (ref 1.8–2.4)
MCH RBC QN AUTO: 31.7 PG (ref 26–34)
MCHC RBC AUTO-ENTMCNC: 34.5 G/DL (ref 31–36)
MCV RBC AUTO: 91.9 FL (ref 80–100)
MONOCYTES ABSOLUTE: 0.3 K/UL (ref 0–1.3)
MONOCYTES RELATIVE PERCENT: 4.2 %
NEUTROPHILS ABSOLUTE: 6.3 K/UL (ref 1.7–7.7)
NEUTROPHILS RELATIVE PERCENT: 79.7 %
PDW BLD-RTO: 13.2 % (ref 12.4–15.4)
PLATELET # BLD: 221 K/UL (ref 135–450)
PMV BLD AUTO: 7.3 FL (ref 5–10.5)
POTASSIUM REFLEX MAGNESIUM: 3.3 MMOL/L (ref 3.5–5.1)
RBC # BLD: 3.89 M/UL (ref 4–5.2)
SODIUM BLD-SCNC: 140 MMOL/L (ref 136–145)
TOTAL PROTEIN: 6.6 G/DL (ref 6.4–8.2)
WBC # BLD: 8 K/UL (ref 4–11)

## 2020-10-02 PROCEDURE — 6370000000 HC RX 637 (ALT 250 FOR IP): Performed by: EMERGENCY MEDICINE

## 2020-10-02 PROCEDURE — 93010 ELECTROCARDIOGRAM REPORT: CPT | Performed by: INTERNAL MEDICINE

## 2020-10-02 PROCEDURE — 93005 ELECTROCARDIOGRAM TRACING: CPT | Performed by: EMERGENCY MEDICINE

## 2020-10-02 PROCEDURE — 2580000003 HC RX 258: Performed by: EMERGENCY MEDICINE

## 2020-10-02 PROCEDURE — 80053 COMPREHEN METABOLIC PANEL: CPT

## 2020-10-02 PROCEDURE — 83690 ASSAY OF LIPASE: CPT

## 2020-10-02 PROCEDURE — 71046 X-RAY EXAM CHEST 2 VIEWS: CPT

## 2020-10-02 PROCEDURE — 6360000002 HC RX W HCPCS: Performed by: EMERGENCY MEDICINE

## 2020-10-02 PROCEDURE — 96374 THER/PROPH/DIAG INJ IV PUSH: CPT

## 2020-10-02 PROCEDURE — 85025 COMPLETE CBC W/AUTO DIFF WBC: CPT

## 2020-10-02 PROCEDURE — 83735 ASSAY OF MAGNESIUM: CPT

## 2020-10-02 PROCEDURE — 99285 EMERGENCY DEPT VISIT HI MDM: CPT

## 2020-10-02 RX ORDER — POTASSIUM CHLORIDE 750 MG/1
20 TABLET, FILM COATED, EXTENDED RELEASE ORAL ONCE
Status: COMPLETED | OUTPATIENT
Start: 2020-10-02 | End: 2020-10-02

## 2020-10-02 RX ORDER — ONDANSETRON 2 MG/ML
4 INJECTION INTRAMUSCULAR; INTRAVENOUS ONCE
Status: COMPLETED | OUTPATIENT
Start: 2020-10-02 | End: 2020-10-02

## 2020-10-02 RX ORDER — 0.9 % SODIUM CHLORIDE 0.9 %
500 INTRAVENOUS SOLUTION INTRAVENOUS ONCE
Status: COMPLETED | OUTPATIENT
Start: 2020-10-02 | End: 2020-10-02

## 2020-10-02 RX ORDER — MAGNESIUM CHLORIDE 71.5 G/G
2 TABLET ORAL DAILY
Qty: 14 TABLET | Refills: 0 | Status: SHIPPED | OUTPATIENT
Start: 2020-10-02 | End: 2020-11-24 | Stop reason: ALTCHOICE

## 2020-10-02 RX ADMIN — ONDANSETRON 4 MG: 2 INJECTION INTRAMUSCULAR; INTRAVENOUS at 12:47

## 2020-10-02 RX ADMIN — POTASSIUM CHLORIDE 20 MEQ: 750 TABLET, FILM COATED, EXTENDED RELEASE ORAL at 12:47

## 2020-10-02 RX ADMIN — SODIUM CHLORIDE 500 ML: 9 INJECTION, SOLUTION INTRAVENOUS at 12:09

## 2020-10-02 ASSESSMENT — PAIN DESCRIPTION - LOCATION
LOCATION: CHEST
LOCATION: CHEST

## 2020-10-02 ASSESSMENT — PAIN SCALES - GENERAL
PAINLEVEL_OUTOF10: 8
PAINLEVEL_OUTOF10: 2

## 2020-10-02 ASSESSMENT — PAIN DESCRIPTION - PAIN TYPE
TYPE: ACUTE PAIN
TYPE: ACUTE PAIN

## 2020-10-02 ASSESSMENT — PAIN DESCRIPTION - DESCRIPTORS
DESCRIPTORS: TIGHTNESS
DESCRIPTORS: TIGHTNESS

## 2020-10-02 ASSESSMENT — PAIN DESCRIPTION - PROGRESSION: CLINICAL_PROGRESSION: GRADUALLY IMPROVING

## 2020-10-02 NOTE — ED NOTES
Discharge and education instructions reviewed. Patient verbalized understanding, teach-back successful. Patient denied questions at this time. No acute distress noted. Patient instructed to follow-up as noted - return to emergency department if symptoms worsen. Patient verbalized understanding. Discharged per EDMD with discharge instructions.           Bart Madrid RN  10/02/20 [id number]

## 2020-10-02 NOTE — ED PROVIDER NOTES
CHIEF COMPLAINT  Chest Pain (Pt reports waking up with chest pain and SOB. Pt has a hx of panic attacks. )      HISTORY OF PRESENT ILLNESS  Macie Montero is a 26 y.o. female who presents to the ED complaining of chest pain that started this morning when she woke up. States she also has shortness of breath. Patient states she thinks she may have had a panic attack. Does have history of anxiety and has had similar episodes previously. States that her mother checked her heart rate with a pulse oximetry and that it was close to 150. Patient states she got lightheaded but did not pass out. Patient states she did recently stop taking her Suboxone 1 week ago voluntarily on her own. Denies any fevers or chills. No nausea or vomiting. States that she does not eat much throughout the day and knows he needs to eat more. Also states that she has been drinking pop and not much water. Denies any urinary complaints. No abdominal pain. No vaginal bleeding or discharge. Patient states she does feel much better now that she is in the emergency room and no longer feels that her heart is racing or has any chest pain. No other complaints, modifying factors or associated symptoms. Nursing notes reviewed.    Past Medical History:   Diagnosis Date    Anxiety     Bipolar 1 disorder ([address]     Depression     Insomnia      Past Surgical History:   Procedure Laterality Date    BREAST SURGERY      benign lump right breast    EYE SURGERY      obstructed tearducts    TUMOR REMOVAL      right chest     Family History   Problem Relation Age of Onset    Diabetes Maternal Grandmother     Cancer Maternal Grandmother     Breast Cancer Neg Hx     Colon Cancer Neg Hx      Social History     Socioeconomic History    Marital status: Single     Spouse name: Not on file    Number of children: Not on file    Years of education: Not on file    Highest education level: Not on file   Occupational History    Not on file Social Needs    Financial resource strain: Not on file    Food insecurity     Worry: Not on file     Inability: Not on file    Transportation needs     Medical: Not on file     Non-medical: Not on file   Tobacco Use    Smoking status: Current Every Day Smoker     Packs/day: 0.50     Types: Cigarettes    Smokeless tobacco: Never Used   Substance and Sexual Activity    Alcohol use: No     Comment: socially; not during pregnancy    Drug use: Yes     Types: Marijuana     Comment: today    Sexual activity: Yes     Partners: Male   Lifestyle    Physical activity     Days per week: Not on file     Minutes per session: Not on file    Stress: Not on file   Relationships    Social connections     Talks on phone: Not on file     Gets together: Not on file     Attends [employer] service: Not on file     Active member of club or organization: Not on file     Attends meetings of clubs or organizations: Not on file     Relationship status: Not on file    Intimate partner violence     Fear of current or ex partner: Not on file     Emotionally abused: Not on file     Physically abused: Not on file     Forced sexual activity: Not on file   Other Topics Concern    Not on file   Social History Narrative    Not on file     No current facility-administered medications for this encounter. Current Outpatient Medications   Medication Sig Dispense Refill    magnesium cl-calcium carbonate (SLOW-MAG) 71.5-119 MG TBEC tablet Take 2 tablets by mouth daily 14 tablet 0    gabapentin (NEURONTIN) 300 MG capsule Take 1 capsule by mouth nightly for 30 days.  30 capsule 2    OLANZapine (ZYPREXA) 5 MG tablet Take 1 tablet by mouth nightly 30 tablet 0    VORTIoxetine HBr (TRINTELLIX) 20 MG TABS tablet TAKE 1 TABLET BY MOUTH DAILY 30 tablet 0    mirtazapine (REMERON) 30 MG tablet TAKE 1 TABLET BY MOUTH EVERY DAY AT NIGHT 30 tablet 3    valACYclovir (VALTREX) 500 MG tablet TAKE 1 TABLET BY MOUTH EVERY DAY 30 tablet 2    L-Methylfolate 15 MG TABS Take 15 mg by mouth daily 30 tablet 3    DiphenhydrAMINE HCl (BENADRYL ALLERGY PO) Take by mouth       Allergies   Allergen Reactions    Bactrim [Sulfamethoxazole-Trimethoprim] Nausea Only    Sulfa Antibiotics Nausea Only         REVIEW OF SYSTEMS  10 systems reviewed, pertinent positives per HPI otherwise noted to be negative    PHYSICAL EXAM  /64   Pulse 100   Temp 98.7 °F (37.1 °C) (Oral)   Resp 16   Ht 5' 2\" (1.575 m)   Wt 104 lb 8 oz (47.4 kg)   LMP 09/01/2020 (Approximate)   SpO2 100%   BMI 19.11 kg/m²      CONSTITUTIONAL: AOx4, cooperative with exam, afebrile   HEAD: normocephalic, atraumatic   EYES: PERRL, EOMI, anicteric sclera   ENT: Moist mucous membranes, uvula midline   LUNGS: Bilateral breath sounds, CTAB, no rales/ronchi/wheezes   CARDIOVASCULAR: RRR, normal S1/S2, no m/r/g, 2+ pulses throughout   ABDOMEN: Soft, non-tender, non-distended, +BS   NEUROLOGIC:  MAEx4, GCS 15   MUSCULOSKELETAL:  No lower extremity edema, no calf tenderness bilaterally   SKIN: No rash, pallor or wounds on exposed surfaces         RADIOLOGY  X-RAYS:  I have reviewed radiologic plain film image(s). ALL OTHER NON-PLAIN FILM IMAGES SUCH AS CT, ULTRASOUND AND MRI HAVE BEEN READ BY THE RADIOLOGIST. XR CHEST (2 VW)   Preliminary Result   No evidence of acute cardiopulmonary disease. EKG INTERPRETATION  Normal sinus rhythm with rate 80, sinus arrhythmia, , QRS 82, QTc 438, no ST elevations, nonspecific ST changes, no acute change compared EKG from 9/9/2020    PROCEDURES    ED COURSE/MDM  Anxiety attack, panic attack, dehydration, electrolyte abnormality, other  Patient seen and evaluated. History and physical as above. Nontoxic, afebrile. Patient presents with what appears to be a panic attack. Patient feeling better now that she is in the emergency room. Vital signs stable on arrival.  Routine labs, urinalysis and urine pregnancy ordered.   ED Course as of Oct

## 2020-10-02 NOTE — ED TRIAGE NOTES
Pt arrived to dept via EMS. Pt c/o generalized chest pain, tachycardia and dizziness. Pt states that she took all of her medications this morning and started to feel this way. Pt has a hx of panic attacks. HR within normal limits upon arrival. Pt awake, alert and oriented x 4. Skin warm and dry/normal color for ethnicity. Resp easy and unlabored. Pt placed in gown and on cardiac monitor. Call light in reach. Will continue to monitor.

## 2020-10-02 NOTE — ED NOTES
Bed: [facility]  Expected date:   Expected time:   Means of arrival: [facility]  Comments:  Not feeling well     Dayron Mera RN  10/02/20 1037

## 2020-10-20 RX ORDER — VALACYCLOVIR HYDROCHLORIDE 500 MG/1
TABLET, FILM COATED ORAL
Qty: 30 TABLET | Refills: 2 | Status: SHIPPED | OUTPATIENT
Start: 2020-10-20 | End: 2021-10-25

## 2020-11-02 NOTE — TELEPHONE ENCOUNTER
Medication:   Requested Prescriptions      No prescriptions requested or ordered in this encounter        Last Filled:      Patient Phone Number: [phone] (home)     Last appt: 9/16/20   Next appt: Visit date not found    Last OARRS:   RX Monitoring 1/8/2020   Periodic Controlled Substance Monitoring No signs of potential drug abuse or diversion identified.

## 2020-11-04 ENCOUNTER — TELEPHONE (OUTPATIENT)
Dept: PRIMARY CARE CLINIC | Age: 30
End: 2020-11-04

## 2020-11-04 NOTE — TELEPHONE ENCOUNTER
Pt needs refill of Trintellix. Also she just found out that her grandfather  about a hour ago. She said she really needs the med. Pt has been out for several days. PHARMACY:  [facility] on [address] in [facility].      PHONE:  [phone]      LOV:  20  FUTURE 20

## 2020-11-24 ENCOUNTER — APPOINTMENT (OUTPATIENT)
Dept: GENERAL RADIOLOGY | Age: 30
End: 2020-11-24
Payer: COMMERCIAL

## 2020-11-24 ENCOUNTER — HOSPITAL ENCOUNTER (EMERGENCY)
Age: 30
Discharge: HOME | End: 2020-11-25
Attending: EMERGENCY MEDICINE
Payer: COMMERCIAL

## 2020-11-24 LAB
A/G RATIO: 1.6 (ref 1.1–2.2)
ALBUMIN SERPL-MCNC: 4.5 G/DL (ref 3.4–5)
ALP BLD-CCNC: 98 U/L (ref 40–129)
ALT SERPL-CCNC: 64 U/L (ref 10–40)
AMPHETAMINE SCREEN, URINE: ABNORMAL
ANION GAP SERPL CALCULATED.3IONS-SCNC: 9 MMOL/L (ref 3–16)
AST SERPL-CCNC: 54 U/L (ref 15–37)
BARBITURATE SCREEN URINE: ABNORMAL
BASOPHILS ABSOLUTE: 0 K/UL (ref 0–0.2)
BASOPHILS RELATIVE PERCENT: 0.3 %
BENZODIAZEPINE SCREEN, URINE: ABNORMAL
BILIRUB SERPL-MCNC: 0.3 MG/DL (ref 0–1)
BILIRUBIN URINE: NEGATIVE
BLOOD, URINE: NEGATIVE
BUN BLDV-MCNC: 9 MG/DL (ref 7–20)
CALCIUM SERPL-MCNC: 9.3 MG/DL (ref 8.3–10.6)
CANNABINOID SCREEN URINE: POSITIVE
CHLORIDE BLD-SCNC: 105 MMOL/L (ref 99–110)
CLARITY: CLEAR
CO2: 28 MMOL/L (ref 21–32)
COCAINE METABOLITE SCREEN URINE: ABNORMAL
COLOR: YELLOW
CREAT SERPL-MCNC: <0.5 MG/DL (ref 0.6–1.1)
D DIMER: <200 NG/ML DDU (ref 0–229)
EOSINOPHILS ABSOLUTE: 0.1 K/UL (ref 0–0.6)
EOSINOPHILS RELATIVE PERCENT: 0.7 %
GFR AFRICAN AMERICAN: >60
GFR NON-AFRICAN AMERICAN: >60
GLOBULIN: 2.9 G/DL
GLUCOSE BLD-MCNC: 123 MG/DL (ref 70–99)
GLUCOSE URINE: NEGATIVE MG/DL
HCG QUALITATIVE: NEGATIVE
HCT VFR BLD CALC: 38.3 % (ref 36–48)
HEMOGLOBIN: 13.2 G/DL (ref 12–16)
KETONES, URINE: NEGATIVE MG/DL
LEUKOCYTE ESTERASE, URINE: NEGATIVE
LIPASE: 18 U/L (ref 13–60)
LYMPHOCYTES ABSOLUTE: 1.8 K/UL (ref 1–5.1)
LYMPHOCYTES RELATIVE PERCENT: 22.9 %
Lab: ABNORMAL
MCH RBC QN AUTO: 31.7 PG (ref 26–34)
MCHC RBC AUTO-ENTMCNC: 34.4 G/DL (ref 31–36)
MCV RBC AUTO: 92.3 FL (ref 80–100)
METHADONE SCREEN, URINE: ABNORMAL
MICROSCOPIC EXAMINATION: NORMAL
MONOCYTES ABSOLUTE: 0.4 K/UL (ref 0–1.3)
MONOCYTES RELATIVE PERCENT: 4.9 %
NEUTROPHILS ABSOLUTE: 5.6 K/UL (ref 1.7–7.7)
NEUTROPHILS RELATIVE PERCENT: 71.2 %
NITRITE, URINE: NEGATIVE
OPIATE SCREEN URINE: ABNORMAL
OXYCODONE URINE: ABNORMAL
PDW BLD-RTO: 13.8 % (ref 12.4–15.4)
PH UA: 7
PH UA: 7 (ref 5–8)
PHENCYCLIDINE SCREEN URINE: ABNORMAL
PLATELET # BLD: 242 K/UL (ref 135–450)
PMV BLD AUTO: 7.2 FL (ref 5–10.5)
POTASSIUM REFLEX MAGNESIUM: 3.7 MMOL/L (ref 3.5–5.1)
PROPOXYPHENE SCREEN: ABNORMAL
PROTEIN UA: NEGATIVE MG/DL
RBC # BLD: 4.15 M/UL (ref 4–5.2)
SODIUM BLD-SCNC: 142 MMOL/L (ref 136–145)
SPECIFIC GRAVITY UA: 1 (ref 1–1.03)
TOTAL PROTEIN: 7.4 G/DL (ref 6.4–8.2)
TROPONIN: <0.01 NG/ML
TSH REFLEX: 0.76 UIU/ML (ref 0.27–4.2)
URINE REFLEX TO CULTURE: NORMAL
URINE TYPE: NORMAL
UROBILINOGEN, URINE: 0.2 E.U./DL
WBC # BLD: 7.8 K/UL (ref 4–11)

## 2020-11-24 PROCEDURE — 84484 ASSAY OF TROPONIN QUANT: CPT

## 2020-11-24 PROCEDURE — 80307 DRUG TEST PRSMV CHEM ANLYZR: CPT

## 2020-11-24 PROCEDURE — 2580000003 HC RX 258: Performed by: NURSE PRACTITIONER

## 2020-11-24 PROCEDURE — 84703 CHORIONIC GONADOTROPIN ASSAY: CPT

## 2020-11-24 PROCEDURE — 36415 COLL VENOUS BLD VENIPUNCTURE: CPT

## 2020-11-24 PROCEDURE — 71045 X-RAY EXAM CHEST 1 VIEW: CPT

## 2020-11-24 PROCEDURE — 85025 COMPLETE CBC W/AUTO DIFF WBC: CPT

## 2020-11-24 PROCEDURE — 85379 FIBRIN DEGRADATION QUANT: CPT

## 2020-11-24 PROCEDURE — 83690 ASSAY OF LIPASE: CPT

## 2020-11-24 PROCEDURE — 80053 COMPREHEN METABOLIC PANEL: CPT

## 2020-11-24 PROCEDURE — 84443 ASSAY THYROID STIM HORMONE: CPT

## 2020-11-24 PROCEDURE — 81003 URINALYSIS AUTO W/O SCOPE: CPT

## 2020-11-24 PROCEDURE — 93005 ELECTROCARDIOGRAM TRACING: CPT | Performed by: NURSE PRACTITIONER

## 2020-11-24 PROCEDURE — 99285 EMERGENCY DEPT VISIT HI MDM: CPT

## 2020-11-24 RX ORDER — 0.9 % SODIUM CHLORIDE 0.9 %
1000 INTRAVENOUS SOLUTION INTRAVENOUS ONCE
Status: COMPLETED | OUTPATIENT
Start: 2020-11-24 | End: 2020-11-24

## 2020-11-24 RX ORDER — BUPRENORPHINE AND NALOXONE 8; 2 MG/1; MG/1
1 FILM, SOLUBLE BUCCAL; SUBLINGUAL DAILY
COMMUNITY
End: 2021-10-25

## 2020-11-24 RX ADMIN — SODIUM CHLORIDE 1000 ML: 9 INJECTION, SOLUTION INTRAVENOUS at 21:40

## 2020-11-24 RX ADMIN — SODIUM CHLORIDE 1000 ML: 9 INJECTION, SOLUTION INTRAVENOUS at 22:40

## 2020-11-24 ASSESSMENT — PAIN DESCRIPTION - LOCATION: LOCATION: CHEST

## 2020-11-24 ASSESSMENT — ENCOUNTER SYMPTOMS
VOMITING: 0
SHORTNESS OF BREATH: 0
DIARRHEA: 0
BACK PAIN: 0
NAUSEA: 0
COUGH: 0
ABDOMINAL PAIN: 0

## 2020-11-24 ASSESSMENT — PAIN SCALES - GENERAL: PAINLEVEL_OUTOF10: 7

## 2020-11-24 ASSESSMENT — PAIN DESCRIPTION - PAIN TYPE: TYPE: ACUTE PAIN

## 2020-11-24 ASSESSMENT — PAIN DESCRIPTION - DESCRIPTORS: DESCRIPTORS: SORE

## 2020-11-25 VITALS
OXYGEN SATURATION: 97 % | BODY MASS INDEX: 18.22 KG/M2 | TEMPERATURE: 98 F | SYSTOLIC BLOOD PRESSURE: 104 MMHG | WEIGHT: 99 LBS | RESPIRATION RATE: 18 BRPM | HEIGHT: 62 IN | HEART RATE: 83 BPM | DIASTOLIC BLOOD PRESSURE: 72 MMHG

## 2020-11-25 LAB
EKG ATRIAL RATE: 96 BPM
EKG DIAGNOSIS: NORMAL
EKG P AXIS: 66 DEGREES
EKG P-R INTERVAL: 124 MS
EKG Q-T INTERVAL: 360 MS
EKG QRS DURATION: 78 MS
EKG QTC CALCULATION (BAZETT): 454 MS
EKG R AXIS: 75 DEGREES
EKG T AXIS: 26 DEGREES
EKG VENTRICULAR RATE: 96 BPM

## 2020-11-25 PROCEDURE — 93010 ELECTROCARDIOGRAM REPORT: CPT | Performed by: INTERNAL MEDICINE

## 2020-11-25 ASSESSMENT — ENCOUNTER SYMPTOMS
WHEEZING: 0
EYE REDNESS: 0
EYE DISCHARGE: 0
SHORTNESS OF BREATH: 0
EYE ITCHING: 0
CHOKING: 0
STRIDOR: 0
PHOTOPHOBIA: 0
APNEA: 0
EYE PAIN: 0
ABDOMINAL DISTENTION: 0
COUGH: 0

## 2020-11-25 NOTE — ED NOTES
Eyes closed on arrival to room. Awakens easily. rates mid chest and left upper chest pain at 5. Asking for a drink. \"I'm getting nauseated because I need a drink so much. \"    NP updated and brought pt water and pepsi as requested     Caren Nevarez RN  11/24/20 3359

## 2020-11-25 NOTE — ED PROVIDER NOTES
[address] [facility]        Pt Name: Eliecer Alva  MRN: [id number]  [place] 1990  Date of evaluation: 11/24/2020  Provider: VARINDER Poole - CNP  PCP: Jyoti Montes MD     I have seen and evaluated this patient with my supervising physician Betzy Benjamin MD.    [address] [place] [address]       Chief Complaint   Patient presents with    Chest Pain     brought by [facility] for chest pain and tachycardia. EMS VS: 132/74  Pulse 131 and down to 101 (sinus tach for EMS)  99%     now reports mid chest and left upper chest pain at 7 since 1830.  no SOB just \"erratic breathing at times\"  reports hx of anxiety and being here about 6 weeks ago for the same    Tachycardia       HISTORY OF PRESENT ILLNESS   (Location, Timing/Onset, Context/Setting, Quality, Duration, Modifying Factors, Severity, Associated Signs and Symptoms)  Note limiting factors. Eliecer Alva is a 26 y.o. female who presents to the emergency department today via EMS from home complaining of chest pain and palpitations. Onset was shortly prior to arrival.  Pain over left breast.  No cough, shortness of breath, or fever. She advised she was seen for the same complaint last month, and had a negative work-up besides low electrolytes. Nursing Notes were all reviewed and agreed with or any disagreements were addressed in the HPI. REVIEW OF SYSTEMS    (2-9 systems for level 4, 10 or more for level 5)     Review of Systems   Constitutional: Negative for chills, diaphoresis and fever. HENT: Negative. Respiratory: Negative for cough and shortness of breath. Cardiovascular: Positive for chest pain and palpitations. Negative for leg swelling. Gastrointestinal: Negative for abdominal pain, diarrhea, nausea and vomiting. Musculoskeletal: Negative for back pain, neck pain and neck stiffness. Skin: Negative for pallor and rash.    Allergic/Immunologic: Negative for immunocompromised state. Neurological: Negative for dizziness, syncope, weakness and headaches. Hematological: Negative for adenopathy. Psychiatric/Behavioral: Negative for confusion. All other systems reviewed and are negative. Positives and Pertinent negatives as per HPI. Except as noted above in the ROS, all other systems were reviewed and negative. PAST MEDICAL HISTORY     Past Medical History:   Diagnosis Date    Anxiety     Bipolar 1 disorder ([facility] [address]     Depression     Hypotension     Insomnia     Polysubstance abuse ([facility] [address]          SURGICAL HISTORY     Past Surgical History:   Procedure Laterality Date    BREAST SURGERY      benign lump right breast    EYE SURGERY      obstructed tearducts    TUMOR REMOVAL      right chest         CURRENTMEDICATIONS       Previous Medications    BUPRENORPHINE-NALOXONE (SUBOXONE) 8-2 MG FILM SL FILM    Place 1 Film under the tongue daily. GABAPENTIN (NEURONTIN) 300 MG CAPSULE    Take 1 capsule by mouth nightly for 30 days. L-METHYLFOLATE 15 MG TABS    Take 15 mg by mouth daily    MEDICAL MARIJUANA    Take 1 each by mouth as needed.  Puffs a pen of marijuana several times a day    MIRTAZAPINE (REMERON) 30 MG TABLET    TAKE 1 TABLET BY MOUTH EVERY DAY AT NIGHT    OLANZAPINE (ZYPREXA) 5 MG TABLET    Take 1 tablet by mouth nightly    VALACYCLOVIR (VALTREX) 500 MG TABLET    TAKE 1 TABLET BY MOUTH EVERY DAY    VORTIOXETINE HBR (TRINTELLIX) 20 MG TABS TABLET    TAKE 1 TABLET BY MOUTH DAILY         ALLERGIES     Bactrim [sulfamethoxazole-trimethoprim] and Sulfa antibiotics    FAMILYHISTORY       Family History   Problem Relation Age of Onset    Diabetes Maternal Grandmother     Cancer Maternal Grandmother     Breast Cancer Neg Hx     Colon Cancer Neg Hx           SOCIAL HISTORY       Social History     Tobacco Use    Smoking status: Current Every Day Smoker     Packs/day: 1.00     Types: Cigarettes    Smokeless tobacco: Never Used   Substance Use Topics    Alcohol use: Yes     Comment: few times a year    Drug use: Yes     Types: Marijuana     Comment: 11/24/20 xanax and fentanyl (off for few days)--goes to suboxone clinic now-medical marijuana also KK       SCREENINGS             PHYSICAL EXAM    (up to 7 for level 4, 8 or more for level 5)     ED Triage Vitals [11/24/20 2030]   BP Temp Temp src Pulse Resp SpO2 Height Weight   103/75 -- -- 87 16 100 % -- --       Physical Exam  Vitals signs and nursing note reviewed. Constitutional:       General: She is not in acute distress. Appearance: Normal appearance. She is well-developed. She is not toxic-appearing. HENT:      Head: Normocephalic and atraumatic. Eyes:      General: No scleral icterus. Conjunctiva/sclera: Conjunctivae normal.   Neck:      Musculoskeletal: Full passive range of motion without pain and neck supple. No neck rigidity. Vascular: No JVD. Cardiovascular:      Rate and Rhythm: Regular rhythm. Tachycardia present. Heart sounds: Normal heart sounds. Pulmonary:      Effort: Pulmonary effort is normal. No respiratory distress. Breath sounds: Normal breath sounds. Abdominal:      General: There is no distension. Palpations: Abdomen is soft. Abdomen is not rigid. Tenderness: There is no abdominal tenderness. Musculoskeletal: Normal range of motion. Right lower leg: She exhibits no tenderness and no swelling. Left lower leg: She exhibits no tenderness and no swelling. Skin:     General: Skin is warm and dry. Capillary Refill: Capillary refill takes less than 2 seconds. Findings: No rash. Neurological:      General: No focal deficit present. Mental Status: She is alert and oriented to person, place, and time. Cranial Nerves: Cranial nerves are intact.    Psychiatric:         Mood and Affect: Mood normal.         DIAGNOSTIC RESULTS   LABS:    Labs Reviewed   COMPREHENSIVE METABOLIC PANEL W/ REFLEX TO MG FOR LOW K - Abnormal; Notable for the following components:       Result Value    Glucose 123 (*)     CREATININE <0.5 (*)     ALT 64 (*)     AST 54 (*)     All other components within normal limits    Narrative:     Performed at:  [facility]  [address] [facility] [place] [address]   Phone [phone]   URINE DRUG SCREEN - Abnormal; Notable for the following components:    Cannabinoid Scrn, Ur POSITIVE (*)     All other components within normal limits    Narrative:     Performed at:  [place] [address] [facility] [place] [address]   Phone [phone]   CBC WITH AUTO DIFFERENTIAL    Narrative:     Performed at:  [address] [place] [address]   Phone [phone]   LIPASE    Narrative:     Performed at:  [address] [place] [address]   Phone [phone]   URINE RT REFLEX TO CULTURE    Narrative:     Performed at:  [address] [place] [address]   Phone [phone]   HCG, SERUM, QUALITATIVE    Narrative:     Performed at:  [address] [place] [address]   Phone [phone]   D-DIMER, QUANTITATIVE    Narrative:     Performed at:  [address] [place] [address]   Phone [phone]   TROPONIN    Narrative:     Performed at:  [address] [place] [address]   Phone [phone]   TSH WITH REFLEX    Narrative:     Performed at:  [address] [place] [address]   Phone [phone]       All other labs were within normal range or not returned as of this dictation. EKG:  All EKG's are interpreted by the Emergency Department Physician in the absence of a cardiologist.  Please see their note for interpretation of EKG. RADIOLOGY:   Non-plain film images such as CT, Ultrasound and MRI are read by the radiologist. Plain radiographic images are visualized and preliminarily interpreted by the ED Provider with the below findings:        Interpretation per the Radiologist below, if available at the time of this note:    XR CHEST PORTABLE   Final Result   No acute process. No results found. PROCEDURES   Unless otherwise noted below, none     Procedures    CRITICAL CARE TIME   N/A    CONSULTS:  None      EMERGENCY DEPARTMENT COURSE and DIFFERENTIAL DIAGNOSIS/MDM:   Vitals:    Vitals:    11/24/20 2215 11/24/20 2230 11/24/20 2245 11/24/20 2300   BP: 105/78 97/69 96/69 104/72   Pulse: 86 83 82 83   Resp: 19 18 17 18   SpO2: 95% 97% 97% 97%   Weight:       Height:           Patient was given the following medications:  Medications   0.9 % sodium chloride bolus (1,000 mLs Intravenous New Bag 11/24/20 2240)   0.9 % sodium chloride bolus (0 mLs Intravenous Stopped 11/24/20 2240)           Differential Diagnosis: Acute Coronary Syndrome, Congestive Heart Failure, Thoracic Dissection, Pericarditis, Pericardial Effusion, Pulmonary Embolism, Pneumonia, Pneumothorax, Ischemic Bowel, Bowel Obstruction, PUD, GERD, Acute Cholecystitis, Pancreatitis, Hepatitis, Colitis, other    Patient seen and examined today for CP. See HPI for patient presentation. Patient is hemodynamically stable, nontoxic, afebrile, and without tachycardia, tachypnea, and hypoxia. Physical exam as above. Well-appearing pleasant 33-year-old female lying in bed in no acute distress. Awake alert and oriented. Abdomen soft and nontender. Lungs CTA bilaterally. Tachycardic on arrival with an otherwise normal cardiac exam.  Neck supple. Drug screen positive for cannabis. She also takes Suboxone which is not consistent with her urine drug screen.   Urine shows no infection blood or ketones. CBC and chemistry unremarkable. Lipase normal.  Pregnancy negative. Troponin negative. D-dimer negative. EKG shows no acute ST changes. CXR normal.    Emergency department course included 2 L of fluid. She no longer has chest pain or shortness of breath. I have no suspicion for ACS or PE. She was discharged home with referral to cardiology for the palpitations. At this time, the evidence for any other entities in the differential is insufficient to justify any further testing. This was explained to the patient. The patient was advised that persistent or worsening symptoms will require further evaluation. I discussed with Dax Watson and/or family the exam results, diagnosis, care, prognosis, reasons to return and the importance of follow up. Patient and/or family is in full agreement with plan and all questions have been answered. Specific discharge instructions explained, including reasons to return to the emergency department. Dax Watson is well appearing, non-toxic, and afebrile at the time of discharge. Patient was instructed to follow up with primary care provider in 24-48 hours, and to instructed to return to ED immediately for any new or worsening concerns. Dax Watson verbalized understanding and discharged home. The patient tolerated their visit well. They were seen and evaluated by the attending physician, Gisselle Abel MD who agreed with the assessment and plan. The patient and / or the family were informed of the results of any tests, a time was given to answer questions, a plan was proposed and they agreed with plan. FINAL IMPRESSION      1.  Palpitations          DISPOSITION/PLAN   DISPOSITION Decision To Discharge 11/24/2020 11:39:03 PM      PATIENT REFERREDTO:  Grace Grullon MD  [address] [place] [address]  [phone]            [id number]

## 2020-11-25 NOTE — ED NOTES
Up to bathroom. Gait steady.   Mid chest and left upper chest pain better-still rates at [facility]  11/24/20 3629

## 2020-11-25 NOTE — ED NOTES
[id number] discharge instructions with pt. Encouraged follow up with PCP and with cardiologist as referred. No chest pain or pain anywhere. Has been in sinus rhythm.          Seymour Mattson RN  11/25/20 0066

## 2020-11-25 NOTE — ED NOTES
Pt wearing mask. Staff wearing procedural mask and eye protection (helmet or goggles) for staff protection-COVID 19    brought by [facility] for chest pain and tachycardia.    EMS VS: 132/74  Pulse 131 and down to 101 (sinus tach for EMS)  99%     now reports mid chest and left upper chest pain at 7 since 1830.  no SOB just \"erratic breathing at times\"  reports hx of anxiety and being here about 6 weeks ago for the same     [place] JAG Colunga  11/24/20 2050

## 2020-11-25 NOTE — ED PROVIDER NOTES
[address] [facility]      Pt Name: Denis Kamara  MRN: [id number]  [place] 1990  Date of evaluation: 11/24/2020  Provider: Kristina Kang MD    CHIEF COMPLAINT       Chief Complaint   Patient presents with    Chest Pain     brought by [facility] for chest pain and tachycardia. EMS VS: 132/74  Pulse 131 and down to 101 (sinus tach for EMS)  99%     now reports mid chest and left upper chest pain at 7 since 1830.  no SOB just \"erratic breathing at times\"  reports hx of anxiety and being here about 6 weeks ago for the same    Tachycardia       HISTORY OF PRESENT ILLNESS    Denis Kamara is a 26 y.o. female who presents to the emergency department with palpitations and chest pain. Chest pain and heart fluttering for weeks. Started again 7 hours PTA today. Endorses left sided pain worse when she thinks about it. No other associated symptoms. Denies cough or SOB. Nursing Notes were reviewed. Including nursing noted for FM, Surgical History, Past Medical History, Social History, vitals, and allergies; agree with all. REVIEW OF SYSTEMS       Review of Systems   Constitutional: Negative for activity change, diaphoresis and fever. HENT: Negative for congestion, dental problem, drooling and ear discharge. Eyes: Negative for photophobia, pain, discharge, redness and itching. Respiratory: Negative for apnea, cough, choking, shortness of breath, wheezing and stridor. Cardiovascular: Positive for chest pain and palpitations. Negative for leg swelling. Gastrointestinal: Negative for abdominal distention. Endocrine: Negative for polyphagia and polyuria. Genitourinary: Negative for vaginal bleeding, vaginal discharge and vaginal pain. Musculoskeletal: Negative for arthralgias. Neurological: Negative for dizziness, facial asymmetry and headaches. Hematological: Negative for adenopathy. Does not bruise/bleed easily. Psychiatric/Behavioral: Negative for agitation, behavioral problems, confusion, decreased concentration, dysphoric mood, hallucinations, self-injury, sleep disturbance and suicidal ideas. The patient is not nervous/anxious and is not hyperactive. Except as noted above the remainder of the review of systems was reviewed and negative. PAST MEDICAL HISTORY     Past Medical History:   Diagnosis Date    Anxiety     Bipolar 1 disorder ([facility] [address] [facility]     Depression     Hypotension     Insomnia     [facility]        SURGICAL HISTORY       Past Surgical History:   Procedure Laterality Date    BREAST SURGERY      benign lump right breast    EYE SURGERY      obstructed tearducts    TUMOR REMOVAL      right chest       CURRENT MEDICATIONS       Discharge Medication List as of 11/24/2020 11:58 PM      CONTINUE these medications which have NOT CHANGED    Details   buprenorphine-naloxone (SUBOXONE) 8-2 MG FILM SL film Place 1 Film under the tongue daily. Historical Med      medical marijuana Take 1 each by mouth as needed. Puffs a pen of marijuana several times a dayHistorical Med      VORTIoxetine HBr (TRINTELLIX) 20 MG TABS tablet TAKE 1 TABLET BY MOUTH DAILY, Disp-30 tablet,R-2Normal      valACYclovir (VALTREX) 500 MG tablet TAKE 1 TABLET BY MOUTH EVERY DAY, Disp-30 tablet,R-2Normal      gabapentin (NEURONTIN) 300 MG capsule Take 1 capsule by mouth nightly for 30 days. , Disp-30 capsule,R-2Normal      mirtazapine (REMERON) 30 MG tablet TAKE 1 TABLET BY MOUTH EVERY DAY AT NIGHT, Disp-30 tablet,R-3Normal      L-Methylfolate 15 MG TABS Take 15 mg by mouth daily, Disp-30 tablet, R-3Normal      OLANZapine (ZYPREXA) 5 MG tablet Take 1 tablet by mouth nightly, Disp-30 tablet,R-0Normal             ALLERGIES     Bactrim [sulfamethoxazole-trimethoprim] and Sulfa antibiotics    FAMILY HISTORY        Family History   Problem Relation Age of Onset    Diabetes Maternal Grandmother     Cancer Maternal Grandmother  Breast Cancer Neg Hx     Colon Cancer Neg Hx        SOCIAL HISTORY       Social History     Socioeconomic History    Marital status: Single     Spouse name: None    Number of children: None    Years of education: None    Highest education level: None   Occupational History    None   Social Needs    Financial resource strain: None    Food insecurity     Worry: None     Inability: None    Transportation needs     Medical: None     Non-medical: None   Tobacco Use    Smoking status: Current Every Day Smoker     Packs/day: 1.00     Types: Cigarettes    Smokeless tobacco: Never Used   Substance and Sexual Activity    Alcohol use: Yes     Comment: few times a year    Drug use: Yes     Types: Marijuana     Comment: 11/24/20 xanax and fentanyl (off for few days)--goes to suboxone clinic now-medical marijuana also [facility]    Sexual activity: Yes     Partners: Male   Lifestyle    Physical activity     Days per week: None     Minutes per session: None    Stress: None   Relationships    Social connections     Talks on phone: None     Gets together: None     Attends [employer] service: None     Active member of club or organization: None     Attends meetings of clubs or organizations: None     Relationship status: None    Intimate partner violence     Fear of current or ex partner: None     Emotionally abused: None     Physically abused: None     Forced sexual activity: None   Other Topics Concern    None   Social History Narrative    None       PHYSICAL EXAM       ED Triage Vitals   BP Temp Temp src Pulse Resp SpO2 Height Weight   11/24/20 2030 -- -- 11/24/20 2025 11/24/20 2030 11/24/20 2030 11/24/20 2042 11/24/20 2042   103/75   89 16 100 % 5' 2\" (1.575 m) 99 lb (44.9 kg)       Physical Exam  Vitals signs and nursing note reviewed. Constitutional:       General: She is not in acute distress. Appearance: She is well-developed. She is not ill-appearing, toxic-appearing or diaphoretic.    HENT:      Head: Normocephalic and atraumatic. Right Ear: External ear normal.      Left Ear: External ear normal.   Eyes:      General:         Right eye: No discharge. Left eye: No discharge. Conjunctiva/sclera: Conjunctivae normal.      Pupils: Pupils are equal, round, and reactive to light. Neck:      Musculoskeletal: Normal range of motion and neck supple. Cardiovascular:      Rate and Rhythm: Regular rhythm. Tachycardia present. Heart sounds: No murmur. Pulmonary:      Effort: Pulmonary effort is normal. No respiratory distress. Breath sounds: Normal breath sounds. No wheezing or rales. Abdominal:      General: Bowel sounds are normal. There is no distension. Palpations: Abdomen is soft. There is no mass. Tenderness: There is no abdominal tenderness. There is no guarding or rebound. Genitourinary:     Comments: Deferred  Musculoskeletal: Normal range of motion. General: No deformity. Skin:     General: Skin is warm. Findings: No erythema or rash. Neurological:      Mental Status: She is alert and oriented to person, place, and time. She is not disoriented. Cranial Nerves: No cranial nerve deficit. Motor: No atrophy or abnormal muscle tone. Coordination: Coordination normal.   Psychiatric:         Behavior: Behavior normal.         Thought Content:  Thought content normal.         DIAGNOSTIC RESULTS     EKG: All EKG's are interpreted by the Emergency Department Physician who either signs or Co-signs this chart in the absence of acardiologist.    EKG shows NSR with sinus arrhythmia non specific ekg changes no stemi    RADIOLOGY:   Non-plain film images such as CT, Ultrasoundand MRI are read by the radiologist. Plain radiographic images are visualized and preliminarily interpreted by the emergency physician with the below findings:    Impression    No acute process.           ED BEDSIDE ULTRASOUND:   Performed by ED Physician - none    LABS:  Labs Reviewed   COMPREHENSIVE METABOLIC PANEL W/ REFLEX TO MG FOR LOW K - Abnormal; Notable for the following components:       Result Value    Glucose 123 (*)     CREATININE <0.5 (*)     ALT 64 (*)     AST 54 (*)     All other components within normal limits    Narrative:     Performed at:  [facility]  [address] [address]   Phone [phone]   URINE DRUG SCREEN - Abnormal; Notable for the following components:    Cannabinoid Scrn, Ur POSITIVE (*)     All other components within normal limits    Narrative:     Performed at:  [facility]  [address] [address]   Phone [phone]   CBC WITH AUTO DIFFERENTIAL    Narrative:     Performed at:  [facility]  [address] [address]   Phone [phone]   LIPASE    Narrative:     Performed at:  [facility]  [address] [address]   Phone [phone]   URINE RT REFLEX TO CULTURE    Narrative:     Performed at:  [facility]  [address] [address]   Phone [phone]   HCG, SERUM, QUALITATIVE    Narrative:     Performed at:  [facility]  [address] [employer] [address]   Phone [phone]   D-DIMER, QUANTITATIVE    Narrative:     Performed at:  [facility]  [address] [address]   Phone [phone]   TROPONIN    Narrative:     Performed at:  [facility]  [address] [address]   Phone [phone]   TSH WITH REFLEX    Narrative:     Performed at:  [facility]  [address] [address]   Phone [phone]       All other labs were withinnormal range or not returned as of this dictation.     EMERGENCY DEPARTMENT COURSE and DIFFERENTIAL DIAGNOSIS/MDM:     PMH, Surgical Hx, FH, Social Hx reviewed by myself (ETOH usage, Tobacco usage, Drug usage reviewed by myself, no pertinent Hx)- No Pertinent Hx     Old records were reviewed by me    No SOB and Dimer negative. PE very unlikely. Heart score 1 at most.     Appears to be anxiety related. Possible underling arrhythmia however nothing seen here. Cardiology follow up given for outpatient evaluation. May need Holter monitor. Will let cardiology determine. I estimate there is LOW risk for Sepsis, MI, Stroke, Tamponade, PTX, Toxicity or other life threatening etiology thus I consider the discharge disposition reasonable. The patient is at low risk for mortality based on demographic, history and clinical factors. Given the best available information and clinical assessment, I estimate the risk of hospitalization to be greater than risk of treatment at home. I have explained to the patient that the risk could rapidly change, given precautions for return and instructions. Explained to patient that the risk for mortality is low based on demographic, history and clinical factors. I discussed with patient the results of evaluation in the ED, diagnosis, care, and prognosis. The plan is to discharge to home. Patient is in agreement with plan and questions have been answered. I also discussed with patient the reasons which may require a return visit and the importance of follow-up care. The patient is well-appearing, nontoxic, and improved at the time of discharge. Patient agrees to call to arrange follow-up care as directed. Patient understands to return immediately for worsening/change in symptoms. CRITICAL CARE TIME   Total Critical Caretime was 21 minutes, excluding separately reportable procedures.   There was a high probability of clinically significant/life threatening deterioration in the patient's condition which required my urgent intervention. PROCEDURES:  Unlessotherwise noted below, none    FINAL IMPRESSION      1.  Palpitations          DISPOSITION/PLAN   DISPOSITION Decision To Discharge 11/24/2020 11:39:03 PM    PATIENT REFERRED TO:  Derrick Grullon MD  [address]          Aniya Metz MD  [address] [facility] [address] [place] [address]  [phone]            DISCHARGE MEDICATIONS:  Discharge Medication List as of 11/24/2020 11:58 PM             (Please note that portions ofthis note were completed with a voice recognition program.  Efforts were made to edit the dictations but occasionally words are mis-transcribed.)    Anyi Gallegos MD(electronically signed)  Attending Emergency Physician        Anyi Gallegos MD  11/25/20 6602

## 2020-11-25 NOTE — ED NOTES
NP to bedside.   Pulse 110 now  Denies having a cough, fever, SOB--just \"a tickle in my throat\"  EKG already done on arrival     Rod Howard [facility]  11/24/20 Michael Meek [address], [facility]  11/24/20 2054       [facility]  11/24/20 5998

## 2020-11-25 NOTE — ED NOTES
Bed: -  Expected date:   Expected time:   Means of arrival:   Comments:  Steve 29yo chest pain tachycardia     Roma Delacruz RN  11/24/20 2024

## 2020-12-11 ENCOUNTER — TELEPHONE (OUTPATIENT)
Dept: FAMILY MEDICINE CLINIC | Age: 30
End: 2020-12-11

## 2020-12-11 NOTE — TELEPHONE ENCOUNTER
Make pt an appt with dr jackson on Monday make it an in person visit not virtual he will be here in person.  In appt notes put [facility]aine sandra [facility]

## 2020-12-11 NOTE — TELEPHONE ENCOUNTER
----- Message from Leola Mai sent at 12/10/2020  3:56 PM EST -----  Subject: Appointment Request    Reason for Call: Routine Physical Exam with PAP    QUESTIONS  Type of Appointment? Established Patient  Reason for appointment request? No appointments available during search  Additional Information for Provider? Patient is needing an appointment   asap. Patient stated that she may need a cardiologist. Charli green. Patient stated that she may have had covi-19 months ago but didn't know   it.  ---------------------------------------------------------------------------  --------------  CALL BACK INFO  What is the best way for the office to contact you? OK to leave message on   voicemail  Preferred Call Back Phone Number? [phone]  ---------------------------------------------------------------------------  --------------  SCRIPT ANSWERS  Relationship to Patient? Self  Appointment reason? Well Care/Follow Ups  Select a Well Care/Follow Ups appointment reason? Adult Physical Exam   [Medicare Annual Wellness   AWV   PAP   Pelvic]  (If the patient is Medicare / 65+ ask this question) Are you requesting a   Medicare Annual Wellness Visit? No  (If the patient is female   ask this question) Are you requesting a pap smear with your physical   exam? Yes  (Is the patient requesting their annual physical and does not need PAP or   AWV per above)? Yes   Have you been diagnosed with   tested for   or told that you are suspected of having COVID-19 (Coronavirus)? No  Have you had a fever or taken medication to treat a fever within the past   3 days? No  Have you had a cough   shortness of breath or flu-like symptoms within the past 3 days? No  Do you currently have flu-like symptoms including fever or chills   cough   shortness of breath   or difficulty breathing   or new loss of taste or smell? No  (Service Expert  click yes below to proceed with [employer] As Usual   Scheduling)?  Yes

## 2021-01-01 PROCEDURE — 93270 REMOTE 30 DAY ECG REV/REPORT: CPT | Performed by: INTERNAL MEDICINE

## 2021-01-05 ENCOUNTER — OFFICE VISIT (OUTPATIENT)
Dept: CARDIOLOGY CLINIC | Age: 31
End: 2021-01-05
Payer: COMMERCIAL

## 2021-01-05 VITALS
DIASTOLIC BLOOD PRESSURE: 56 MMHG | TEMPERATURE: 98 F | HEIGHT: 62 IN | OXYGEN SATURATION: 98 % | WEIGHT: 97 LBS | HEART RATE: 68 BPM | SYSTOLIC BLOOD PRESSURE: 110 MMHG | BODY MASS INDEX: 17.85 KG/M2

## 2021-01-05 DIAGNOSIS — R00.0 RACING HEART BEAT: ICD-10-CM

## 2021-01-05 DIAGNOSIS — F17.200 SMOKING ADDICTION: ICD-10-CM

## 2021-01-05 DIAGNOSIS — F41.9 ANXIETY: ICD-10-CM

## 2021-01-05 DIAGNOSIS — R06.09 DOE (DYSPNEA ON EXERTION): ICD-10-CM

## 2021-01-05 DIAGNOSIS — R07.9 CHEST PAIN, UNSPECIFIED TYPE: Primary | ICD-10-CM

## 2021-01-05 DIAGNOSIS — F19.10 POLYSUBSTANCE ABUSE (HCC): ICD-10-CM

## 2021-01-05 DIAGNOSIS — R42 DIZZINESS: ICD-10-CM

## 2021-01-05 PROCEDURE — G8427 DOCREV CUR MEDS BY ELIG CLIN: HCPCS | Performed by: INTERNAL MEDICINE

## 2021-01-05 PROCEDURE — 4004F PT TOBACCO SCREEN RCVD TLK: CPT | Performed by: INTERNAL MEDICINE

## 2021-01-05 PROCEDURE — 93000 ELECTROCARDIOGRAM COMPLETE: CPT | Performed by: INTERNAL MEDICINE

## 2021-01-05 PROCEDURE — G8484 FLU IMMUNIZE NO ADMIN: HCPCS | Performed by: INTERNAL MEDICINE

## 2021-01-05 PROCEDURE — 99244 OFF/OP CNSLTJ NEW/EST MOD 40: CPT | Performed by: INTERNAL MEDICINE

## 2021-01-05 PROCEDURE — G8419 CALC BMI OUT NRM PARAM NOF/U: HCPCS | Performed by: INTERNAL MEDICINE

## 2021-01-05 NOTE — PROGRESS NOTES
 Hypotension     Insomnia     Polysubstance abuse (HCC)      Past Surgical History:   Procedure Laterality Date    BREAST SURGERY      benign lump right breast    EYE SURGERY      obstructed tearducts    TUMOR REMOVAL      right chest     Family History   Problem Relation Age of Onset    Diabetes Maternal Grandmother     Cancer Maternal Grandmother     Breast Cancer Neg Hx     Colon Cancer Neg Hx      Social History     Tobacco Use    Smoking status: Current Every Day Smoker     Packs/day: 1.00     Types: Cigarettes    Smokeless tobacco: Never Used   Substance Use Topics    Alcohol use: Yes     Comment: few times a year    Drug use: Yes     Types: Marijuana     Comment: 11/24/20 xanax and fentanyl (off for few days)--goes to suboxone clinic now-medical marijuana also [facility]       Allergies   Allergen Reactions    Bactrim [Sulfamethoxazole-Trimethoprim] Nausea Only    Sulfa Antibiotics Nausea Only     Current Outpatient Medications   Medication Sig Dispense Refill    buprenorphine-naloxone (SUBOXONE) 8-2 MG FILM SL film Place 1 Film under the tongue daily.  medical marijuana Take 1 each by mouth as needed. Puffs a pen of marijuana several times a day      VORTIoxetine HBr (TRINTELLIX) 20 MG TABS tablet TAKE 1 TABLET BY MOUTH DAILY 30 tablet 2    valACYclovir (VALTREX) 500 MG tablet TAKE 1 TABLET BY MOUTH EVERY DAY 30 tablet 2    gabapentin (NEURONTIN) 300 MG capsule Take 1 capsule by mouth nightly for 30 days. (Patient taking differently: Take 300 mg by mouth every evening. ) 30 capsule 2    OLANZapine (ZYPREXA) 5 MG tablet Take 1 tablet by mouth nightly 30 tablet 0    mirtazapine (REMERON) 30 MG tablet TAKE 1 TABLET BY MOUTH EVERY DAY AT NIGHT 30 tablet 3    L-Methylfolate 15 MG TABS Take 15 mg by mouth daily 30 tablet 3     No current facility-administered medications for this visit.         Review of Systems: · Constitutional: she has not been having her menstruation,. She is losing weight while trying to gain weight, poor appetite. + fatigue. no sleep pattern, or activity level changes. No fevers, chills. · Eyes: No visual changes or diplopia. No scleral icterus. · ENT: No Headaches, hearing loss or vertigo. No mouth sores or sore throat. · Cardiovascular: as reviewed in HPI  · Respiratory: No cough or wheezing, no sputum production. No hematemesis. · Gastrointestinal: No abdominal pain, appetite loss, blood in stools. No change in bowel or bladder habits. · Genitourinary: No dysuria, trouble voiding, or hematuria. · Musculoskeletal:  No gait disturbance, no joint complaints. · Integumentary: No rash or pruritis. · Neurological: No headache, diplopia, change in muscle strength, numbness or tingling. · Psychiatric: + anxiety, drug abuse-reviewed and refer to HPI. · Endocrine: No temperature intolerance. No excessive thirst, fluid intake, or urination. No tremor. · Hematologic/Lymphatic: No abnormal bruising or bleeding, blood clots or swollen lymph nodes. · Allergic/Immunologic: No nasal congestion or hives. Physical Exam:   BP (!) 110/56   Pulse 68   Temp 98 °F (36.7 °C)   Ht 5' 2\" (1.575 m)   Wt 97 lb (44 kg) Comment: with shoes  LMP 08/05/2020   SpO2 98%   BMI 17.74 kg/m²   Wt Readings from Last 3 Encounters:   12/21/20 97 lb (44 kg)   11/24/20 99 lb (44.9 kg)   10/02/20 104 lb 8 oz (47.4 kg)     Constitutional: She is oriented to person, place, and time. She appears well-developed and well-nourished. In no acute distress. Head: Normocephalic and atraumatic. Pupils equal and round. Neck: Neck supple. No JVP or carotid bruit appreciated. No mass and no thyromegaly present. No lymphadenopathy present. Cardiovascular: Normal rate. Normal heart sounds. Exam reveals no gallop and no friction rub. No murmur heard. 4) Smoking addiction  1 ppd and I have encouraged working towards smoking cessation and spending 3-5 minutes discussing a plan. We will call with test results and see her back in 8 weeks. Complexity of medical decision making-high    Thank you very much for allowing me to participate in the care of your patient. Please do not hesitate to contact me if you have any questions. Sincerely,  Greg Caruso MD      [address] [place] [address]  Ph: [phone]  Fax: [phone]    This note was scribed in the presence of Dr. Kilo Becker MD by Brody Love, JAG.

## 2021-01-05 NOTE — PATIENT INSTRUCTIONS
Some monitors start recording on their own when they detect an abnormal heartbeat. With others, you may have to start the recording when you have symptoms. Your doctor will explain which type of monitor you have and how to use it. How is the monitor used? · With some monitors, you may need to do something to record when you have symptoms. You might use a handheld device to start it. Or you may need to press a button on the monitor. · You may keep a diary of what you were doing when you had symptoms such as chest pain or dizziness. Your doctor will show you how to do this. · You may need to send the information from your monitor to your doctor through a phone line or online. Some monitors send it on their own. You will get instructions from your doctor. Your information will stay private and secure no matter how it's sent. · You may be able to do most of the things you usually do. But try to follow your doctor's instructions for bathing, exercise, and other activities. How long will you have the monitor? You may use the monitor for up to a month or longer. It depends on how long it takes to record irregular heartbeat episodes. It also depends on how long your doctor wants to keep monitoring your heart. What happens after the test?  · Shahida Tobar return the monitor to your doctor's office or hospital.  · You'll meet with your doctor to talk about the information recorded during your test.  · Your doctor will check your diary of symptoms. He or she will compare the timing of your activities and symptoms with the recorded heart pattern. · Depending on your test results, your doctor may talk with you about other tests or treatment options. Follow-up care is a key part of your treatment and safety. Be sure to make and go to all appointments, and call your doctor if you are having problems. It's also a good idea to keep a list of the medicines you take. Ask your doctor when you can expect to have your test results. · You may receive medicine through a vein (intravenously, or IV). The IV can be used to give you a contrast material, which helps your doctor get good views of your heart. · Small pads or patches (electrodes) will be placed on the skin of your chest to record your heart rate during the test.  · A small amount of gel will be rubbed on the side of your chest to help  the sound waves. · The transducer will be pressed firmly against your chest and moved slowly back and forth. It is usually moved to different areas on your chest or belly to get specific views of your heart. · You will be asked to do several things, such as hold very still, breathe in and out very slowly, hold your breath, or lie on your left side. What are the risks of the test?  There are no known risks from having this test.  · No electricity passes through your body during the test. There is no danger of getting an electrical shock. · You do not receive any radiation. What happens after the test?  · You will probably be able to go home right away. It depends on the reason for the test.  · You can go back to your usual activities right away. Follow-up care is a key part of your treatment and safety. Be sure to make and go to all appointments, and call your doctor if you are having problems. It's also a good idea to keep a list of the medicines you take. Ask your doctor when you can expect to have your test results. Where can you learn more? Go to [email or web address] org and sign in to your [employer] account. Enter E130 in the [employer] box to learn more about \"Transthoracic Echocardiogram: About This Test.\"     If you do not have an account, please click on the \"Sign Up Now\" link. Current as of: December 16, 2019               Content Version: 12.6  © 2658-1539 [employer]. Care instructions adapted under license by [facility] ([facility]). If you have questions about a medical condition or this instruction, always ask your healthcare professional. [address] any warranty or liability for your use of this information.

## 2021-02-01 PROCEDURE — 93272 ECG/REVIEW INTERPRET ONLY: CPT | Performed by: INTERNAL MEDICINE

## 2021-02-04 DIAGNOSIS — R00.0 TACHYCARDIA: ICD-10-CM

## 2021-02-05 ENCOUNTER — HOSPITAL ENCOUNTER (OUTPATIENT)
Dept: NON INVASIVE DIAGNOSTICS | Age: 31
Discharge: HOME | End: 2021-02-05
Payer: COMMERCIAL

## 2021-02-05 DIAGNOSIS — R07.9 CHEST PAIN, UNSPECIFIED TYPE: ICD-10-CM

## 2021-02-05 DIAGNOSIS — R06.09 DOE (DYSPNEA ON EXERTION): ICD-10-CM

## 2021-02-05 DIAGNOSIS — R00.0 RACING HEART BEAT: ICD-10-CM

## 2021-02-05 DIAGNOSIS — R42 DIZZINESS: ICD-10-CM

## 2021-02-05 LAB
LV EF: 63 %
LVEF MODALITY: NORMAL

## 2021-02-05 PROCEDURE — 93306 TTE W/DOPPLER COMPLETE: CPT

## 2021-07-13 ENCOUNTER — VIRTUAL VISIT (OUTPATIENT)
Dept: FAMILY MEDICINE CLINIC | Age: 31
End: 2021-07-13
Payer: COMMERCIAL

## 2021-07-13 ENCOUNTER — TELEPHONE (OUTPATIENT)
Dept: FAMILY MEDICINE CLINIC | Age: 31
End: 2021-07-13

## 2021-07-13 DIAGNOSIS — R63.0 DECREASED APPETITE: ICD-10-CM

## 2021-07-13 DIAGNOSIS — R56.9 SEIZURE-LIKE ACTIVITY (HCC): ICD-10-CM

## 2021-07-13 DIAGNOSIS — K04.7 DENTAL INFECTION: Primary | ICD-10-CM

## 2021-07-13 DIAGNOSIS — F60.3 BORDERLINE PERSONALITY DISORDER (HCC): ICD-10-CM

## 2021-07-13 DIAGNOSIS — R63.4 WEIGHT LOSS: ICD-10-CM

## 2021-07-13 PROCEDURE — G8428 CUR MEDS NOT DOCUMENT: HCPCS | Performed by: FAMILY MEDICINE

## 2021-07-13 PROCEDURE — 99214 OFFICE O/P EST MOD 30 MIN: CPT | Performed by: FAMILY MEDICINE

## 2021-07-13 RX ORDER — LANSOPRAZOLE 30 MG/1
30 CAPSULE, DELAYED RELEASE ORAL DAILY
Qty: 30 CAPSULE | Refills: 1 | Status: SHIPPED | OUTPATIENT
Start: 2021-07-13 | End: 2021-10-25

## 2021-07-13 RX ORDER — AMOXICILLIN 500 MG/1
500 CAPSULE ORAL 3 TIMES DAILY
Qty: 21 CAPSULE | Refills: 0 | Status: SHIPPED | OUTPATIENT
Start: 2021-07-13 | End: 2021-07-20

## 2021-07-13 NOTE — TELEPHONE ENCOUNTER
Called pt  She has trouble with bad gums, noticing drainage from one area and some tooth pain. Concern for abscess and need for abx asap. She is having a hard time getting dental care with Medicaid. I directed her to call Insurance for help finding a dentist.  She agrees to do so. Has appt in 2 weeks with Dr Grullon for other issues. Can we do VV tmrw to address urgent need?

## 2021-07-13 NOTE — PROGRESS NOTES
7/13/2021    This is a 26 y.o. female  HPI     Virtual visit for dental concern    Bottom row of teeth has a receding gumline. She has noted purulent discharge from the base of one of her teeth on the bottom row for the past few days. She has noticed extreme sensitivity in her bottom row of teeth. She is working toward getting into a dentist but her insurance has made this challenging.  - no fever, chills, or systemic symptoms    She also notes a decreased appetite present for the past 6 months or so. Over the past month or so she notes a 10+ pound weight loss. - She is no longer on any psychiatric medication or her suboxone. She is on medical marijuana. - besides decreased appetite, nausea, and bloating, she denies poly abdominal pain. Hx of seizure like activity  - has not recurred. At the time it was felt to likely to be due to possibly her medications or other drug use    Review of Systems     Current Outpatient Medications   Medication Sig Dispense Refill    lansoprazole (PREVACID) 30 MG delayed release capsule Take 1 capsule by mouth daily 30 capsule 1    amoxicillin (AMOXIL) 500 MG capsule Take 1 capsule by mouth 3 times daily for 7 days 21 capsule 0    buprenorphine-naloxone (SUBOXONE) 8-2 MG FILM SL film Place 1 Film under the tongue daily.  medical marijuana Take 1 each by mouth as needed. Puffs a pen of marijuana several times a day      valACYclovir (VALTREX) 500 MG tablet TAKE 1 TABLET BY MOUTH EVERY DAY (Patient taking differently: TAKE 1 TABLET BY MOUTH PRN) 30 tablet 2     No current facility-administered medications for this visit. There were no vitals taken for this visit. Physical Exam    Wt Readings from Last 3 Encounters:   01/05/21 97 lb (44 kg)   12/21/20 97 lb (44 kg)   11/24/20 99 lb (44.9 kg)       BP Readings from Last 3 Encounters:   01/05/21 (!) 110/56   11/24/20 104/72   10/02/20 106/81       Assessment/Plan:  1. Dental infection  Treat with antibiotic.  Gave her info for [address]  - amoxicillin (AMOXIL) 500 MG capsule; Take 1 capsule by mouth 3 times daily for 7 days  Dispense: 21 capsule; Refill: 0    2. Decreased appetite  Chronic. Check blood work prior to next appt. Trial PPI. May be due to marijuana use which we discussed. May consider referral for EGD based on our upcoming in person eval in a few weeks. - CBC Auto Differential; Future  - Comprehensive Metabolic Panel; Future  - Vitamin B12; Future  - Folate; Future  - HIV Screen; Future    3. Seizure-like activity (HCC)  No known recent recurrence    4. Borderline personality disorder ([facility] [address]  Not currently on medication. We have a follow up appt soon to discuss her chronic medical conditions    5. Weight loss  As above. Check labs. Has f/u with me in clinic in a few weeks to review in more detail  - CBC Auto Differential; Future  - Comprehensive Metabolic Panel; Future  - Vitamin B12; Future  - Folate; Future  - HIV Screen; Future  - lansoprazole (PREVACID) 30 MG delayed release capsule; Take 1 capsule by mouth daily  Dispense: 30 capsule; Refill: 1    F/u as scheduled in a few weeks to review chronic medical conditions    Denis Kamara was evaluated through a synchronous (real-time) audio-video encounter. The patient (or guardian if applicable) is aware that this is a billable service. Verbal consent to proceed has been obtained within the past 12 months. The visit was conducted pursuant to the emergency declaration under the [facility], [address] [place] [address] authority and the [employer] Act. Patient identification was verified, and a caregiver was present when appropriate. The patient was located in a state where the provider was credentialed to provide care.     Total time spent for this encounter: not billed by time    --Becky Pope MD on 7/13/2021 at 4:28 PM    An electronic signature was used to authenticate this note.

## 2021-07-13 NOTE — TELEPHONE ENCOUNTER
----- Message from Vitor Anderson sent at 7/13/2021 10:39 AM EDT -----  Subject: Message to Provider    QUESTIONS  Information for Provider? The patient is calling today with multiple   issues. The patient has scheduled an appointment for 7/29 with Dr. Ryder Sanchez. Patient is experiencing tooth pain and puss drainage and is requesting to   have an antibiotic sent to her pharmacy. Pharmacy? [facility] [address]. Please   call the pt  ---------------------------------------------------------------------------  --------------  CALL BACK INFO  What is the best way for the office to contact you? OK to leave message on   voicemail  Preferred Call Back Phone Number? [phone]  ---------------------------------------------------------------------------  --------------  SCRIPT ANSWERS  Relationship to Patient?  Self

## 2021-07-13 NOTE — TELEPHONE ENCOUNTER
Agree with plan. This clinic also accepts Medicaid:  [facility]  [phone]    Can do virtual tomorrow - can do virtual today if needed (just text me, around 4pm would work). Thanks.

## 2021-10-25 ENCOUNTER — APPOINTMENT (OUTPATIENT)
Dept: GENERAL RADIOLOGY | Age: 31
End: 2021-10-25
Payer: COMMERCIAL

## 2021-10-25 ENCOUNTER — HOSPITAL ENCOUNTER (EMERGENCY)
Age: 31
Discharge: HOME | End: 2021-10-25
Attending: EMERGENCY MEDICINE
Payer: COMMERCIAL

## 2021-10-25 ENCOUNTER — NURSE TRIAGE (OUTPATIENT)
Dept: OTHER | Facility: CLINIC | Age: 31
End: 2021-10-25

## 2021-10-25 VITALS
OXYGEN SATURATION: 99 % | BODY MASS INDEX: 16.92 KG/M2 | HEIGHT: 62 IN | WEIGHT: 91.93 LBS | SYSTOLIC BLOOD PRESSURE: 120 MMHG | TEMPERATURE: 98.4 F | RESPIRATION RATE: 18 BRPM | HEART RATE: 67 BPM | DIASTOLIC BLOOD PRESSURE: 83 MMHG

## 2021-10-25 DIAGNOSIS — R07.9 CHEST PAIN, UNSPECIFIED TYPE: Primary | ICD-10-CM

## 2021-10-25 LAB
ANION GAP SERPL CALCULATED.3IONS-SCNC: 10 MMOL/L (ref 3–16)
BASOPHILS ABSOLUTE: 0 K/UL (ref 0–0.2)
BASOPHILS RELATIVE PERCENT: 0.3 %
BILIRUBIN URINE: NEGATIVE
BLOOD, URINE: NEGATIVE
BUN BLDV-MCNC: 6 MG/DL (ref 7–20)
CALCIUM SERPL-MCNC: 9.3 MG/DL (ref 8.3–10.6)
CHLORIDE BLD-SCNC: 103 MMOL/L (ref 99–110)
CLARITY: CLEAR
CO2: 25 MMOL/L (ref 21–32)
COLOR: YELLOW
CREAT SERPL-MCNC: <0.5 MG/DL (ref 0.6–1.1)
EKG ATRIAL RATE: 73 BPM
EKG DIAGNOSIS: NORMAL
EKG P AXIS: 68 DEGREES
EKG P-R INTERVAL: 128 MS
EKG Q-T INTERVAL: 380 MS
EKG QRS DURATION: 82 MS
EKG QTC CALCULATION (BAZETT): 418 MS
EKG R AXIS: 73 DEGREES
EKG T AXIS: 73 DEGREES
EKG VENTRICULAR RATE: 73 BPM
EOSINOPHILS ABSOLUTE: 0 K/UL (ref 0–0.6)
EOSINOPHILS RELATIVE PERCENT: 0.4 %
GFR AFRICAN AMERICAN: >60
GFR NON-AFRICAN AMERICAN: >60
GLUCOSE BLD-MCNC: 141 MG/DL (ref 70–99)
GLUCOSE URINE: NEGATIVE MG/DL
HCG(URINE) PREGNANCY TEST: NEGATIVE
HCT VFR BLD CALC: 40.7 % (ref 36–48)
HEMOGLOBIN: 13.6 G/DL (ref 12–16)
KETONES, URINE: NEGATIVE MG/DL
LEUKOCYTE ESTERASE, URINE: NEGATIVE
LYMPHOCYTES ABSOLUTE: 2.3 K/UL (ref 1–5.1)
LYMPHOCYTES RELATIVE PERCENT: 31.1 %
MAGNESIUM: 1.7 MG/DL (ref 1.8–2.4)
MCH RBC QN AUTO: 30.9 PG (ref 26–34)
MCHC RBC AUTO-ENTMCNC: 33.4 G/DL (ref 31–36)
MCV RBC AUTO: 92.2 FL (ref 80–100)
MICROSCOPIC EXAMINATION: NORMAL
MONOCYTES ABSOLUTE: 0.4 K/UL (ref 0–1.3)
MONOCYTES RELATIVE PERCENT: 5.1 %
NEUTROPHILS ABSOLUTE: 4.7 K/UL (ref 1.7–7.7)
NEUTROPHILS RELATIVE PERCENT: 63.1 %
NITRITE, URINE: NEGATIVE
PDW BLD-RTO: 13.4 % (ref 12.4–15.4)
PH UA: 6 (ref 5–8)
PLATELET # BLD: 242 K/UL (ref 135–450)
PMV BLD AUTO: 7.1 FL (ref 5–10.5)
POTASSIUM REFLEX MAGNESIUM: 3.3 MMOL/L (ref 3.5–5.1)
PROTEIN UA: NEGATIVE MG/DL
RBC # BLD: 4.41 M/UL (ref 4–5.2)
SODIUM BLD-SCNC: 138 MMOL/L (ref 136–145)
SPECIFIC GRAVITY UA: 1.01 (ref 1–1.03)
TROPONIN: <0.01 NG/ML
URINE REFLEX TO CULTURE: NORMAL
URINE TYPE: NORMAL
UROBILINOGEN, URINE: 0.2 E.U./DL
WBC # BLD: 7.4 K/UL (ref 4–11)

## 2021-10-25 PROCEDURE — 99284 EMERGENCY DEPT VISIT MOD MDM: CPT

## 2021-10-25 PROCEDURE — 84703 CHORIONIC GONADOTROPIN ASSAY: CPT

## 2021-10-25 PROCEDURE — 6370000000 HC RX 637 (ALT 250 FOR IP): Performed by: EMERGENCY MEDICINE

## 2021-10-25 PROCEDURE — 83735 ASSAY OF MAGNESIUM: CPT

## 2021-10-25 PROCEDURE — 85025 COMPLETE CBC W/AUTO DIFF WBC: CPT

## 2021-10-25 PROCEDURE — 80048 BASIC METABOLIC PNL TOTAL CA: CPT

## 2021-10-25 PROCEDURE — 81003 URINALYSIS AUTO W/O SCOPE: CPT

## 2021-10-25 PROCEDURE — 84484 ASSAY OF TROPONIN QUANT: CPT

## 2021-10-25 PROCEDURE — 71045 X-RAY EXAM CHEST 1 VIEW: CPT

## 2021-10-25 PROCEDURE — 93010 ELECTROCARDIOGRAM REPORT: CPT | Performed by: INTERNAL MEDICINE

## 2021-10-25 PROCEDURE — 93005 ELECTROCARDIOGRAM TRACING: CPT | Performed by: EMERGENCY MEDICINE

## 2021-10-25 RX ORDER — POTASSIUM CHLORIDE 750 MG/1
40 TABLET, FILM COATED, EXTENDED RELEASE ORAL ONCE
Status: COMPLETED | OUTPATIENT
Start: 2021-10-25 | End: 2021-10-25

## 2021-10-25 RX ORDER — LANOLIN ALCOHOL/MO/W.PET/CERES
400 CREAM (GRAM) TOPICAL ONCE
Status: COMPLETED | OUTPATIENT
Start: 2021-10-25 | End: 2021-10-25

## 2021-10-25 RX ORDER — CYCLOBENZAPRINE HCL 10 MG
10 TABLET ORAL ONCE
Status: COMPLETED | OUTPATIENT
Start: 2021-10-25 | End: 2021-10-25

## 2021-10-25 RX ORDER — CYCLOBENZAPRINE HCL 5 MG
5 TABLET ORAL 2 TIMES DAILY PRN
Qty: 10 TABLET | Refills: 0 | Status: SHIPPED | OUTPATIENT
Start: 2021-10-25 | End: 2021-10-25

## 2021-10-25 RX ADMIN — CYCLOBENZAPRINE HYDROCHLORIDE 10 MG: 10 TABLET, FILM COATED ORAL at 02:46

## 2021-10-25 RX ADMIN — Medication 400 MG: at 02:46

## 2021-10-25 RX ADMIN — POTASSIUM CHLORIDE 40 MEQ: 750 TABLET, FILM COATED, EXTENDED RELEASE ORAL at 02:47

## 2021-10-25 NOTE — TELEPHONE ENCOUNTER
[facility]. Brief description of triage: Pt calling in to report symptoms of head pain. States head pain started yesterday. Rates pain as mild to moderate. Reports she was evaluated in the ED last night but head pain is worsening. States her left side is weaker than her right, describes it as feeling heavy. Denies migraines or head injury. Triage indicates for patient to: Call 911 now    Care advice provided, patient verbalizes understanding; denies any other questions or concerns; instructed to call back for any new or worsening symptoms. Attention Provider: Thank you for allowing me to participate in the care of your patient. The patient was connected to triage in response to information provided to the [facility]. Please do not respond through this encounter as the response is not directed to a shared pool. Reason for Disposition   [1] Weakness of the face, arm or leg on one side of the body AND [2] new onset    Answer Assessment - Initial Assessment Questions  1. LOCATION: \"Where does it hurt? \"       Back and into neck    2. ONSET: \"When did the headache start? \" (Minutes, hours or days)       Yesterday 10/24/21    3. PATTERN: \"Does the pain come and go, or has it been constant since it started? \"      Constant    4. SEVERITY: \"How bad is the pain? \" and \"What does it keep you from doing? \"  (e.g., Scale 1-10; mild, moderate, or severe)    - MILD (1-3): doesn't interfere with normal activities     - MODERATE (4-7): interferes with normal activities or awakens from sleep     - SEVERE (8-10): excruciating pain, unable to do any normal activities         Mild to moderate    5. RECURRENT SYMPTOM: \"Have you ever had headaches before? \" If so, ask: \"When was the last time? \" and \"What happened that time? \"       No    6. CAUSE: \"What do you think is causing the headache? \"      unknown    7.  MIGRAINE: \"Have you been diagnosed with migraine headaches? \" If so, ask: \"Is this headache similar? \"       No    8. HEAD INJURY: \"Has there been any recent injury to the head? \"       No    9. OTHER SYMPTOMS: \"Do you have any other symptoms? \" (fever, stiff neck, eye pain, sore throat, cold symptoms)      Stiff neck, sore throat    10. PREGNANCY: \"Is there any chance you are pregnant? \" \"When was your last menstrual period? \"        No    Protocols used: HEADACHE-ADULT-AH

## 2021-10-25 NOTE — ED NOTES
Discharge and education instructions reviewed. Patient verbalized understanding, teach-back successful. Patient denied questions at this time. No acute distress noted. Patient instructed to follow-up as noted - return to emergency department if symptoms worsen. Patient verbalized understanding. Discharged per EDMD with discharge instructions.         [address] [place] [address]  10/25/21 7265

## 2021-10-25 NOTE — ED PROVIDER NOTES
[facility]      Pt Name: Mandeep Linton  MRN: [id number]  [place] 1990  Date of evaluation: 10/25/2021  Provider: Aretha Cartagena MD    [address] [place] [address]       Chief Complaint   Patient presents with    Chest Pain     x 1 week, right sided chest pain 6/10, history of anxiety    Headache     started today 6/10    Numbness     in finger tips that comes and goes        HISTORY OF PRESENT ILLNESS    Mandeep Linton is a 33 y.o. female who presents to the emergency department with anxiety. Patient's had multiple issues last few days. She is recently gotten off of all drugs. Also got off all of her anxiety medicine. Has had intermittent chest pains, muscle aches, numbness and tingling in all her fingers and extremities. States she is concerned for her heart. States it feels like she is having a heart attack. Decided to come to the emergency room. Currently without pain. States she feels extremely anxious. No SI or HI. No fevers or chills. No neck pain. Rest makes symptoms better. Movement makes symptoms worse. No other associated symptoms. Nursing Notes were reviewed. Including nursing noted for FM, Surgical History, Past Medical History, Social History, vitals, and allergies; agree with all. REVIEW OF SYSTEMS       Review of Systems    Except as noted above the remainder of the review of systems was reviewed and negative.      PAST MEDICAL HISTORY     Past Medical History:   Diagnosis Date    Anxiety     Bipolar 1 disorder ([facility] [address]     Depression     Hypotension     Insomnia     Polysubstance abuse ([facility] [address]        SURGICAL HISTORY       Past Surgical History:   Procedure Laterality Date    BREAST SURGERY      benign lump right breast    EYE SURGERY      obstructed tearducts    TUMOR REMOVAL      right chest       CURRENT MEDICATIONS       Previous Medications    BUPRENORPHINE-NALOXONE (SUBOXONE) 8-2 MG FILM SL FILM Place 1 Film under the tongue daily. LANSOPRAZOLE (PREVACID) 30 MG DELAYED RELEASE CAPSULE    Take 1 capsule by mouth daily    MEDICAL MARIJUANA    Take 1 each by mouth as needed. Puffs a pen of marijuana several times a day    VALACYCLOVIR (VALTREX) 500 MG TABLET    TAKE 1 TABLET BY MOUTH EVERY DAY       ALLERGIES     Bactrim [sulfamethoxazole-trimethoprim] and Sulfa antibiotics    FAMILY HISTORY        Family History   Problem Relation Age of Onset    Diabetes Maternal Grandmother     Cancer Maternal Grandmother     Breast Cancer Neg Hx     Colon Cancer Neg Hx        SOCIAL HISTORY       Social History     Socioeconomic History    Marital status: Single     Spouse name: Not on file    Number of children: Not on file    Years of education: Not on file    Highest education level: Not on file   Occupational History    Not on file   Tobacco Use    Smoking status: Current Every Day Smoker     Packs/day: 1.00     Types: Cigarettes    Smokeless tobacco: Never Used   Vaping Use    Vaping Use: Some days    Devices: [employer]   Substance and Sexual Activity    Alcohol use: Yes     Comment: few times a year    Drug use: Yes     Types: Marijuana     Comment: 11/24/20 xanax and fentanyl (off for few days)--goes to suboxone clinic now-medical marijuana also [facility]    Sexual activity: Yes     Partners: Male     Comment: 2 weeks ago   Other Topics Concern    Not on file   Social History Narrative    Not on file     Social Determinants of Health     Financial Resource Strain:     Difficulty of Paying Living Expenses:    Food Insecurity:     Worried About Running Out of Food in the Last Year:     Ran Out of Food in the Last Year:    Transportation Needs:     Lack of Transportation (Medical):      Lack of Transportation (Non-Medical):    Physical Activity:     Days of Exercise per Week:     Minutes of Exercise per Session:    Stress:     Feeling of Stress :    Social Connections:     Frequency of Communication with Friends and Family:     Frequency of Social Gatherings with Friends and Family:     Attends [employer] Services:     Active Member of Clubs or Organizations:     Attends Club or Organization Meetings:     Marital Status:    Intimate Partner Violence:     Fear of Current or Ex-Partner:     Emotionally Abused:     Physically Abused:     Sexually Abused:        PHYSICAL EXAM       ED Triage Vitals [10/25/21 0044]   BP Temp Temp Source Pulse Resp SpO2 Height Weight   120/83 98.4 °F (36.9 °C) Oral 95 16 98 % 5' 2\" (1.575 m) 91 lb 14.9 oz (41.7 kg)       Physical Exam  Vitals and nursing note reviewed. Constitutional:       General: She is not in acute distress. Appearance: She is well-developed. She is not ill-appearing, toxic-appearing or diaphoretic. HENT:      Head: Normocephalic and atraumatic. Right Ear: External ear normal.      Left Ear: External ear normal.   Eyes:      General:         Right eye: No discharge. Left eye: No discharge. Conjunctiva/sclera: Conjunctivae normal.      Pupils: Pupils are equal, round, and reactive to light. Cardiovascular:      Rate and Rhythm: Normal rate and regular rhythm. Heart sounds: No murmur heard. Pulmonary:      Effort: Pulmonary effort is normal. No respiratory distress. Breath sounds: Normal breath sounds. No wheezing or rales. Abdominal:      General: Bowel sounds are normal. There is no distension. Palpations: Abdomen is soft. There is no mass. Tenderness: There is no abdominal tenderness. There is no guarding or rebound. Genitourinary:     Comments: Deferred  Musculoskeletal:         General: No deformity. Normal range of motion. Cervical back: Normal range of motion and neck supple. Skin:     General: Skin is warm. Findings: No erythema or rash. Neurological:      Mental Status: She is alert and oriented to person, place, and time. She is not disoriented.       Cranial Nerves: No cranial Phone [phone]   CBC WITH AUTO DIFFERENTIAL    Narrative:     Performed at:  [place] [address] [place]   Phone [phone]       All other labs were withinnormal range or not returned as of this dictation. EMERGENCY DEPARTMENT COURSE and DIFFERENTIAL DIAGNOSIS/MDM:     PMH, Surgical Hx, FH, Social Hx reviewed by myself (ETOH usage, Tobacco usage, Drug usage reviewed by myself, no pertinent Hx)- No Pertinent Hx     Old records were reviewed by me     75-year-old female with anxiety. Discussed following up with her primary care doctor. She verbalized understanding. Flexeril was given for patient's muscle aches. She denies trauma or fall. No IV drug use currently. No fevers or chills. No cord compression symptoms. No shortness of breath. Vitals reassuring. Work-up otherwise unremarkable. I estimate there is LOW risk for Sepsis, MI, Stroke, Tamponade, PTX, Toxicity or other life threatening etiology thus I consider the discharge disposition reasonable. The patient is at low risk for mortality based on demographic, history and clinical factors. Given the best available information and clinical assessment, I estimate the risk of hospitalization to be greater than risk of treatment at home. I have explained to the patient that the risk could rapidly change, given precautions for return and instructions. Explained to patient that the risk for mortality is low based on demographic, history and clinical factors. I discussed with patient the results of evaluation in the ED, diagnosis, care, and prognosis. The plan is to discharge to home. Patient is in agreement with plan and questions have been answered. I also discussed with patient the reasons which may require a return visit and the importance of follow-up care. The patient is well-appearing, nontoxic, and improved at the time of discharge.   Patient agrees to call to arrange follow-up care as directed. Patient understands to return immediately for worsening/change in symptoms. CRITICAL CARE TIME   Total Critical Caretime was 0 minutes, excluding separately reportable procedures. There was a high probability of clinically significant/life threatening deterioration in the patient's condition which required my urgent intervention. PROCEDURES:  Unlessotherwise noted below, none    FINAL IMPRESSION      1.  Chest pain, unspecified type          DISPOSITION/PLAN   DISPOSITION Decision To Discharge 10/25/2021 02:34:13 AM    PATIENT REFERRED TO:  MD Cristina Garcia [place] [address] [address]            DISCHARGE MEDICATIONS:  New Prescriptions    CYCLOBENZAPRINE (FLEXERIL) 5 MG TABLET    Take 1 tablet by mouth 2 times daily as needed for Muscle spasms          (Please note that portions ofthis note were completed with a voice recognition program.  Efforts were made to edit the dictations but occasionally words are mis-transcribed.)    Jesi Rios MD(electronically signed)  Attending Emergency Physician            Jesi Rios MD  10/25/21 3636

## 2021-11-16 ENCOUNTER — APPOINTMENT (OUTPATIENT)
Dept: GENERAL RADIOLOGY | Age: 31
End: 2021-11-16
Payer: COMMERCIAL

## 2021-11-16 ENCOUNTER — APPOINTMENT (OUTPATIENT)
Dept: CT IMAGING | Age: 31
End: 2021-11-16
Payer: COMMERCIAL

## 2021-11-16 ENCOUNTER — HOSPITAL ENCOUNTER (EMERGENCY)
Age: 31
Discharge: HOME | End: 2021-11-16
Payer: COMMERCIAL

## 2021-11-16 VITALS
SYSTOLIC BLOOD PRESSURE: 109 MMHG | RESPIRATION RATE: 16 BRPM | DIASTOLIC BLOOD PRESSURE: 66 MMHG | WEIGHT: 92.37 LBS | HEART RATE: 97 BPM | OXYGEN SATURATION: 100 % | BODY MASS INDEX: 17 KG/M2 | TEMPERATURE: 97 F | HEIGHT: 62 IN

## 2021-11-16 DIAGNOSIS — R51.9 ACUTE NONINTRACTABLE HEADACHE, UNSPECIFIED HEADACHE TYPE: Primary | ICD-10-CM

## 2021-11-16 DIAGNOSIS — F12.10 MARIJUANA ABUSE: ICD-10-CM

## 2021-11-16 LAB
A/G RATIO: 2.3 (ref 1.1–2.2)
ALBUMIN SERPL-MCNC: 5 G/DL (ref 3.4–5)
ALP BLD-CCNC: 60 U/L (ref 40–129)
ALT SERPL-CCNC: 13 U/L (ref 10–40)
AMPHETAMINE SCREEN, URINE: ABNORMAL
ANION GAP SERPL CALCULATED.3IONS-SCNC: 12 MMOL/L (ref 3–16)
AST SERPL-CCNC: 17 U/L (ref 15–37)
BARBITURATE SCREEN URINE: ABNORMAL
BASOPHILS ABSOLUTE: 0 K/UL (ref 0–0.2)
BASOPHILS RELATIVE PERCENT: 0.3 %
BENZODIAZEPINE SCREEN, URINE: ABNORMAL
BILIRUB SERPL-MCNC: <0.2 MG/DL (ref 0–1)
BILIRUBIN URINE: NEGATIVE
BLOOD, URINE: NEGATIVE
BUN BLDV-MCNC: 7 MG/DL (ref 7–20)
CALCIUM SERPL-MCNC: 9.3 MG/DL (ref 8.3–10.6)
CANNABINOID SCREEN URINE: POSITIVE
CHLORIDE BLD-SCNC: 100 MMOL/L (ref 99–110)
CLARITY: CLEAR
CO2: 28 MMOL/L (ref 21–32)
COCAINE METABOLITE SCREEN URINE: ABNORMAL
COLOR: YELLOW
CREAT SERPL-MCNC: <0.5 MG/DL (ref 0.6–1.1)
EOSINOPHILS ABSOLUTE: 0 K/UL (ref 0–0.6)
EOSINOPHILS RELATIVE PERCENT: 0.5 %
GFR AFRICAN AMERICAN: >60
GFR NON-AFRICAN AMERICAN: >60
GLUCOSE BLD-MCNC: 98 MG/DL (ref 70–99)
GLUCOSE URINE: NEGATIVE MG/DL
HCG QUALITATIVE: NEGATIVE
HCT VFR BLD CALC: 39 % (ref 36–48)
HEMOGLOBIN: 13.2 G/DL (ref 12–16)
KETONES, URINE: NEGATIVE MG/DL
LEUKOCYTE ESTERASE, URINE: NEGATIVE
LYMPHOCYTES ABSOLUTE: 2.5 K/UL (ref 1–5.1)
LYMPHOCYTES RELATIVE PERCENT: 40.4 %
Lab: ABNORMAL
MCH RBC QN AUTO: 31.2 PG (ref 26–34)
MCHC RBC AUTO-ENTMCNC: 33.9 G/DL (ref 31–36)
MCV RBC AUTO: 92.2 FL (ref 80–100)
METHADONE SCREEN, URINE: ABNORMAL
MICROSCOPIC EXAMINATION: NORMAL
MONOCYTES ABSOLUTE: 0.3 K/UL (ref 0–1.3)
MONOCYTES RELATIVE PERCENT: 4.8 %
NEUTROPHILS ABSOLUTE: 3.4 K/UL (ref 1.7–7.7)
NEUTROPHILS RELATIVE PERCENT: 54 %
NITRITE, URINE: NEGATIVE
OPIATE SCREEN URINE: ABNORMAL
OXYCODONE URINE: ABNORMAL
PDW BLD-RTO: 13.4 % (ref 12.4–15.4)
PH UA: 7
PH UA: 7 (ref 5–8)
PHENCYCLIDINE SCREEN URINE: ABNORMAL
PLATELET # BLD: 231 K/UL (ref 135–450)
PMV BLD AUTO: 7.1 FL (ref 5–10.5)
POTASSIUM REFLEX MAGNESIUM: 3.9 MMOL/L (ref 3.5–5.1)
PROPOXYPHENE SCREEN: ABNORMAL
PROTEIN UA: NEGATIVE MG/DL
RBC # BLD: 4.23 M/UL (ref 4–5.2)
SODIUM BLD-SCNC: 140 MMOL/L (ref 136–145)
SPECIFIC GRAVITY UA: 1.01 (ref 1–1.03)
TOTAL PROTEIN: 7.2 G/DL (ref 6.4–8.2)
URINE REFLEX TO CULTURE: NORMAL
URINE TYPE: NORMAL
UROBILINOGEN, URINE: 0.2 E.U./DL
WBC # BLD: 6.3 K/UL (ref 4–11)

## 2021-11-16 PROCEDURE — 80307 DRUG TEST PRSMV CHEM ANLYZR: CPT

## 2021-11-16 PROCEDURE — 80053 COMPREHEN METABOLIC PANEL: CPT

## 2021-11-16 PROCEDURE — 70450 CT HEAD/BRAIN W/O DYE: CPT

## 2021-11-16 PROCEDURE — 2580000003 HC RX 258: Performed by: NURSE PRACTITIONER

## 2021-11-16 PROCEDURE — 96374 THER/PROPH/DIAG INJ IV PUSH: CPT

## 2021-11-16 PROCEDURE — 84703 CHORIONIC GONADOTROPIN ASSAY: CPT

## 2021-11-16 PROCEDURE — 96375 TX/PRO/DX INJ NEW DRUG ADDON: CPT

## 2021-11-16 PROCEDURE — 81003 URINALYSIS AUTO W/O SCOPE: CPT

## 2021-11-16 PROCEDURE — 99283 EMERGENCY DEPT VISIT LOW MDM: CPT

## 2021-11-16 PROCEDURE — 6360000002 HC RX W HCPCS: Performed by: NURSE PRACTITIONER

## 2021-11-16 PROCEDURE — 85025 COMPLETE CBC W/AUTO DIFF WBC: CPT

## 2021-11-16 PROCEDURE — 36415 COLL VENOUS BLD VENIPUNCTURE: CPT

## 2021-11-16 RX ORDER — 0.9 % SODIUM CHLORIDE 0.9 %
1000 INTRAVENOUS SOLUTION INTRAVENOUS ONCE
Status: COMPLETED | OUTPATIENT
Start: 2021-11-16 | End: 2021-11-16

## 2021-11-16 RX ORDER — KETOROLAC TROMETHAMINE 30 MG/ML
15 INJECTION, SOLUTION INTRAMUSCULAR; INTRAVENOUS ONCE
Status: COMPLETED | OUTPATIENT
Start: 2021-11-16 | End: 2021-11-16

## 2021-11-16 RX ORDER — PROCHLORPERAZINE EDISYLATE 5 MG/ML
10 INJECTION INTRAMUSCULAR; INTRAVENOUS ONCE
Status: COMPLETED | OUTPATIENT
Start: 2021-11-16 | End: 2021-11-16

## 2021-11-16 RX ADMIN — KETOROLAC TROMETHAMINE 15 MG: 30 INJECTION, SOLUTION INTRAMUSCULAR; INTRAVENOUS at 21:16

## 2021-11-16 RX ADMIN — SODIUM CHLORIDE 1000 ML: 9 INJECTION, SOLUTION INTRAVENOUS at 21:16

## 2021-11-16 RX ADMIN — PROCHLORPERAZINE EDISYLATE 10 MG: 5 INJECTION INTRAMUSCULAR; INTRAVENOUS at 21:17

## 2021-11-16 ASSESSMENT — PAIN DESCRIPTION - PAIN TYPE
TYPE: ACUTE PAIN
TYPE: ACUTE PAIN

## 2021-11-16 ASSESSMENT — PAIN DESCRIPTION - DESCRIPTORS
DESCRIPTORS: ACHING
DESCRIPTORS: ACHING

## 2021-11-16 ASSESSMENT — PAIN DESCRIPTION - FREQUENCY
FREQUENCY: CONTINUOUS
FREQUENCY: CONTINUOUS

## 2021-11-16 ASSESSMENT — PAIN SCALES - GENERAL
PAINLEVEL_OUTOF10: 6
PAINLEVEL_OUTOF10: 6
PAINLEVEL_OUTOF10: 5

## 2021-11-16 ASSESSMENT — PAIN DESCRIPTION - ONSET
ONSET: ON-GOING
ONSET: ON-GOING

## 2021-11-16 ASSESSMENT — PAIN DESCRIPTION - PROGRESSION
CLINICAL_PROGRESSION: NOT CHANGED
CLINICAL_PROGRESSION: NOT CHANGED

## 2021-11-16 ASSESSMENT — PAIN DESCRIPTION - LOCATION
LOCATION: HEAD
LOCATION: HEAD

## 2021-11-16 ASSESSMENT — PAIN - FUNCTIONAL ASSESSMENT
PAIN_FUNCTIONAL_ASSESSMENT: PREVENTS OR INTERFERES SOME ACTIVE ACTIVITIES AND ADLS
PAIN_FUNCTIONAL_ASSESSMENT: PREVENTS OR INTERFERES SOME ACTIVE ACTIVITIES AND ADLS

## 2021-11-16 ASSESSMENT — PAIN DESCRIPTION - ORIENTATION
ORIENTATION: RIGHT
ORIENTATION: RIGHT

## 2021-11-17 NOTE — ED NOTES
Discharge and education instructions reviewed. Patient verbalized understanding, teach-back successful. Patient denied questions at this time. No acute distress noted. Patient instructed to follow-up as noted - return to emergency department if symptoms worsen. Patient verbalized understanding. Discharged per EDMD with discharged instructions.        Mickie Robbins RN  11/16/21 0532

## 2021-11-17 NOTE — ED PROVIDER NOTES
[address] [facility]        Pt Name: Rachel Draper  MRN: [id number]  [place] 1990  Date of evaluation: 11/16/2021  Provider: VARINDER Fan CNP  PCP: Rodriguez Nowak MD  Note Started: 7:54 PM EST       KULDEEP. I have evaluated this patient. My supervising physician was available for consultation. CHIEF COMPLAINT       Chief Complaint   Patient presents with    Headache     pt complains of HA on right side of head that radies through right shoulder and right flank        HISTORY OF PRESENT ILLNESS   (Location, Timing/Onset, Context/Setting, Quality, Duration, Modifying Factors, Severity, Associated Signs and Symptoms)  Note limiting factors. Chief Complaint: Headache    Rachel Draper is a 33 y.o. female with medical history of anxiety, bipolar, depression, hypotension, insomnia, polysubstance abuse, seizure-like activity, borderline personality disorder who presents the emergency department with complaints of a headache with multiple other complaints. Patient notes she has had all symptoms for at least the past month since her last emergency department visit. She has noticed intermittent paresthesias to her hands and feet, tinnitus, change in oral intake, and intermittent cramping of calves. She did note she started taking a multivitamin and started to do yoga to help with her symptoms. Current pain level is 4/10 and intermittent. She does have a point tenderness area to the top of her scalp that is reproducible. She did call a PCP to set up outpatient appointment her first appointment is in December. She does note she has been clean from opiates for the past 8 months and is no longer taking Suboxone. She denies any recent trauma, accidents, injuries, or falls.   She denies any associated visual disturbance, weakness, nausea, vomiting, diarrhea, loss of bowel or bladder function, lightheaded, urinary symptoms, vaginal bleeding or discharge, dizzy, or syncope. She denies starting any new prescription medication. Nursing Notes were all reviewed and agreed with or any disagreements were addressed in the HPI. REVIEW OF SYSTEMS    (2-9 systems for level 4, 10 or more for level 5)     Review of Systems    Positives and Pertinent negatives as per HPI. Except as noted above in the ROS, all other systems were reviewed and negative. PAST MEDICAL HISTORY     Past Medical History:   Diagnosis Date    Anxiety     Bipolar 1 disorder ([facility] [address]     Depression     Hypotension     Insomnia     Polysubstance abuse ([facility] [address]          SURGICAL HISTORY     Past Surgical History:   Procedure Laterality Date    BREAST SURGERY      benign lump right breast    EYE SURGERY      obstructed tearducts    TUMOR REMOVAL      right chest         CURRENTMEDICATIONS       Discharge Medication List as of 11/16/2021 10:28 PM      CONTINUE these medications which have NOT CHANGED    Details   medical marijuana Take 1 each by mouth as needed.  Puffs a pen of marijuana several times a dayHistorical Med               ALLERGIES     Bactrim [sulfamethoxazole-trimethoprim] and Sulfa antibiotics    FAMILYHISTORY       Family History   Problem Relation Age of Onset    Diabetes Maternal Grandmother     Cancer Maternal Grandmother     Breast Cancer Neg Hx     Colon Cancer Neg Hx           SOCIAL HISTORY       Social History     Tobacco Use    Smoking status: Current Every Day Smoker     Packs/day: 1.00     Types: Cigarettes    Smokeless tobacco: Never Used   Vaping Use    Vaping Use: Some days    Devices: [employer]   Substance Use Topics    Alcohol use: Yes     Comment: few times a year    Drug use: Yes     Types: Marijuana [place])     Comment: 11/24/20 xanax and fentanyl (off for few days)--goes to suboxone clinic now-medical marijuana also KK       SCREENINGS             PHYSICAL EXAM    (up to 7 for level 4, 8 or more for level 5)     ED Triage Vitals   BP Temp Temp Source Pulse Resp SpO2 Height Weight   11/16/21 1934 11/16/21 1934 11/16/21 1934 11/16/21 1934 11/16/21 1934 11/16/21 1934 11/16/21 1930 11/16/21 1930   131/86 97 °F (36.1 °C) Oral 97 17 100 % 5' 2\" (1.575 m) 92 lb 6 oz (41.9 kg)       Physical Exam  Vitals and nursing note reviewed. Constitutional:       General: She is awake. Appearance: Normal appearance. She is well-developed and underweight. HENT:      Head: Normocephalic and atraumatic. Jaw: There is normal jaw occlusion. Right Ear: Hearing normal.      Left Ear: Hearing normal.      Nose: Nose normal. No congestion or rhinorrhea. Right Sinus: No maxillary sinus tenderness or frontal sinus tenderness. Left Sinus: No maxillary sinus tenderness or frontal sinus tenderness. Mouth/Throat:      Lips: Pink. Mouth: Mucous membranes are moist.      Pharynx: Oropharynx is clear. Eyes:      General:         Right eye: No discharge. Left eye: No discharge. Extraocular Movements: Extraocular movements intact. Conjunctiva/sclera: Conjunctivae normal.      Pupils: Pupils are equal, round, and reactive to light. Cardiovascular:      Rate and Rhythm: Normal rate and regular rhythm. Pulses:           Radial pulses are 2+ on the right side and 2+ on the left side. Heart sounds: Normal heart sounds. Pulmonary:      Effort: Pulmonary effort is normal. No respiratory distress. Breath sounds: Normal breath sounds. Abdominal:      General: Bowel sounds are normal.      Palpations: Abdomen is soft. Tenderness: There is no abdominal tenderness. Musculoskeletal:         General: Normal range of motion. Cervical back: Full passive range of motion without pain and normal range of motion. No spinous process tenderness or muscular tenderness. Right lower leg: No edema. Left lower leg: No edema. Skin:     General: Skin is warm and dry.       Coloration: Skin is not pale. Neurological:      General: No focal deficit present. Mental Status: She is alert and oriented to person, place, and time. Cranial Nerves: Cranial nerves are intact. Sensory: Sensation is intact. Motor: Motor function is intact. Coordination: Coordination is intact. Gait: Gait is intact. Psychiatric:         Behavior: Behavior normal. Behavior is cooperative. DIAGNOSTIC RESULTS   LABS:    Labs Reviewed   COMPREHENSIVE METABOLIC PANEL W/ REFLEX TO MG FOR LOW K - Abnormal; Notable for the following components:       Result Value    CREATININE <0.5 (*)     Albumin/Globulin Ratio 2.3 (*)     All other components within normal limits    Narrative:     Performed at:  [address] [employer] [facility]   Phone [phone]   [address] - Abnormal; Notable for the following components:    Cannabinoid Scrn, Ur POSITIVE (*)     All other components within normal limits    Narrative:     Performed at:  [address] [employer] [address]   Phone [phone]   CBC WITH AUTO DIFFERENTIAL    Narrative:     Performed at:  [address] [employer] [address]   Phone [phone]   URINE RT REFLEX TO CULTURE    Narrative:     Performed at:  [address] [employer] [address]   Phone [phone]   HCG, SERUM, QUALITATIVE    Narrative:     Performed at:  [address] [employer] [facility] [address]   Phone [phone]       When ordered only abnormal lab results are displayed. All other labs were within normal range or not returned as of this dictation. EKG:  When ordered, EKG's are interpreted by the Emergency Department Physician in the absence of a cardiologist.  Please see their note for interpretation of EKG.    RADIOLOGY:   Non-plain film images such as CT, Ultrasound and MRI are read by the radiologist. Plain radiographic images are visualized and preliminarily interpreted by the ED Provider with the below findings:        Interpretation per the Radiologist below, if available at the time of this note:    CT HEAD WO CONTRAST   Final Result   No acute intracranial abnormality. No results found. PROCEDURES   Unless otherwise noted below, none     Procedures    CRITICAL CARE TIME   N/A    CONSULTS:  None      EMERGENCY DEPARTMENT COURSE and DIFFERENTIAL DIAGNOSIS/MDM:   Vitals:    Vitals:    11/16/21 1930 11/16/21 1934 11/16/21 2215   BP:  131/86 109/66   Pulse:  97    Resp:  17 16   Temp:  97 °F (36.1 °C)    TempSrc:  Oral    SpO2:  100%    Weight: 92 lb 6 oz (41.9 kg)     Height: 5' 2\" (1.575 m)         Patient was given the following medications:  Medications   ketorolac (TORADOL) injection 15 mg (15 mg IntraVENous Given 11/16/21 2116)   prochlorperazine (COMPAZINE) injection 10 mg (10 mg IntraVENous Given 11/16/21 2117)   0.9 % sodium chloride bolus (0 mLs IntraVENous Stopped 11/16/21 2216)           Care of this patient took place during the COVID-19 pandemic emergency. ED COURSE & MEDICAL DECISION MAKING    - The patient presented to the ER with complaints of h/a. Vital signs were reviewed. Exam well-developed, well-nourished female who appears uncomfortable. Peripheral IV placed. Labs, Imaging ordered. - Pertinent Labs & Imaging studies reviewed. (See chart for details)   -  Patient seen and evaluated in the emergency department. -  Triage and nursing notes reviewed and incorporated. -  Old chart records reviewed and incorporated. -  KULDEEP. I have evaluated this patient.   My supervising physician was available for consultation.  -  Differential diagnosis includes: CVA, TIA, ICH, SAH, aneurysm, dissection, meningitis, glioblastoma, meningioma, metabolic encephalopathy, VTE, SDH, dehydration, sepsis, COVID-19  -  Work-up included:  See above  -  ED treatment included:  Normal saline, Toradol, Compazine   -  Results discussed with patient. Elvira Babin is a 17-year-old female with complaints of headache for the past month. Patient also has been having intermittent paresthesias and decreased p.o. intake. She has an appoint with her PCP at the end of next month and presented to the emergency department for evaluation. On exam she is neurovascular status intact. There is no neurological deficits noted. Lab work and imaging was obtained. hCG negative. CMP with creatinine 0.5. Urinalysis negative. UDS positive for cannabinoids. CBC is unremarkable. CT head without contrast shows no acute and cranial abnormality. hCG negative. CMP with creatinine less than 0.5. Urinalysis is unremarkable. Patient was informed on the positive UDS result. She does admit to continuing to smoke marijuana. Instructed to stop smoking marijuana to see if this helps alleviate her symptoms. She was instructed on home treatment and care. She was given strict return discharge instructions. Shared decision making is complete and patient is stable for discharge at this time. FINAL IMPRESSION      1. Acute nonintractable headache, unspecified headache type    2.  Marijuana abuse          DISPOSITION/PLAN   DISPOSITION        PATIENT REFERRED TO:  Alex Grullon MD  [place] [address]  [phone]    Call in 2 days  As needed, If symptoms worsen    [facility] Emergency Department  [address] [address] [phone]  Go to   As needed      DISCHARGE MEDICATIONS:  Discharge Medication List as of 11/16/2021 10:28 PM          DISCONTINUED MEDICATIONS:  Discharge Medication List as of 11/16/2021 10:28 PM                 (Please note that portions of this note were completed with a voice recognition program.  Efforts were made to edit the dictations but occasionally words are mis-transcribed.)    VARINDER Fan CNP (electronically signed)            VARINDER Fan CNP  11/16/21 3143

## 2021-11-23 ENCOUNTER — TELEPHONE (OUTPATIENT)
Dept: FAMILY MEDICINE CLINIC | Age: 31
End: 2021-11-23

## 2021-11-23 NOTE — TELEPHONE ENCOUNTER
----- Message from Maia Mitchell sent at 11/23/2021  4:13 PM EST -----  Subject: Message to Provider    QUESTIONS  Information for Provider? Patient went to Urgent care back in September. She says she's been trying to book an appointment but can not ever get in. They gave her a prescription at the urgent care but her caresource will   not pay because it needs doctors authorization and urgent care does not do   that. The medication is called Jailyn Cherry and the urgent care number where she   went is [phone].   ---------------------------------------------------------------------------  --------------  CALL BACK INFO  What is the best way for the office to contact you? OK to leave message on   voicemail  Preferred Call Back Phone Number? [phone]  ---------------------------------------------------------------------------  --------------  SCRIPT ANSWERS  Relationship to Patient?  Self

## 2021-11-24 ENCOUNTER — OFFICE VISIT (OUTPATIENT)
Dept: FAMILY MEDICINE CLINIC | Age: 31
End: 2021-11-24
Payer: COMMERCIAL

## 2021-11-24 VITALS
DIASTOLIC BLOOD PRESSURE: 70 MMHG | RESPIRATION RATE: 18 BRPM | HEART RATE: 92 BPM | HEIGHT: 62 IN | OXYGEN SATURATION: 97 % | WEIGHT: 93 LBS | BODY MASS INDEX: 17.11 KG/M2 | SYSTOLIC BLOOD PRESSURE: 106 MMHG

## 2021-11-24 DIAGNOSIS — M26.623 BILATERAL TEMPOROMANDIBULAR JOINT PAIN: ICD-10-CM

## 2021-11-24 DIAGNOSIS — R19.8 TEETH CLENCHING: ICD-10-CM

## 2021-11-24 DIAGNOSIS — E44.0 MODERATE PROTEIN-CALORIE MALNUTRITION (HCC): Primary | ICD-10-CM

## 2021-11-24 DIAGNOSIS — R63.6 LOW WEIGHT: ICD-10-CM

## 2021-11-24 DIAGNOSIS — F50.01 ANOREXIA NERVOSA, RESTRICTING TYPE: ICD-10-CM

## 2021-11-24 DIAGNOSIS — F39 MOOD DISORDER (HCC): ICD-10-CM

## 2021-11-24 PROCEDURE — G8419 CALC BMI OUT NRM PARAM NOF/U: HCPCS | Performed by: FAMILY MEDICINE

## 2021-11-24 PROCEDURE — 99214 OFFICE O/P EST MOD 30 MIN: CPT | Performed by: FAMILY MEDICINE

## 2021-11-24 PROCEDURE — 4004F PT TOBACCO SCREEN RCVD TLK: CPT | Performed by: FAMILY MEDICINE

## 2021-11-24 PROCEDURE — G8427 DOCREV CUR MEDS BY ELIG CLIN: HCPCS | Performed by: FAMILY MEDICINE

## 2021-11-24 PROCEDURE — G8484 FLU IMMUNIZE NO ADMIN: HCPCS | Performed by: FAMILY MEDICINE

## 2021-11-24 RX ORDER — LAMOTRIGINE 25 MG/1
TABLET ORAL
Qty: 100 TABLET | Refills: 1 | Status: SHIPPED | OUTPATIENT
Start: 2021-11-24 | End: 2022-02-24

## 2021-11-24 RX ORDER — ALBENDAZOLE 200 MG/1
TABLET, FILM COATED ORAL
Qty: 4 TABLET | Refills: 0 | Status: SHIPPED | OUTPATIENT
Start: 2021-11-24 | End: 2021-12-23

## 2021-11-24 RX ORDER — TIZANIDINE 2 MG/1
2 TABLET ORAL 3 TIMES DAILY PRN
Qty: 90 TABLET | Refills: 1 | Status: SHIPPED | OUTPATIENT
Start: 2021-11-24 | End: 2022-01-10

## 2021-11-24 NOTE — PROGRESS NOTES
11/24/2021    This is a 33 y.o. female   Chief Complaint   Patient presents with    Other     Pt thinks she has pinworms. Pt jaw is contently cluchted. Pt can feel it when she has a bowel movement. HPI    Is worried she may have pinworms. Has tried OTC meds without significant improvement of symptoms  Reports seeing something in her stool sometimes and sensation of urgency, rectal discomfort. Has a daughter in  who lives with dad, Celestina Partida lives on her own  - no stool changes    Weight  - has been trying to eat more and gain weight. Reports about an 8 pound weight gain since the summer. Was drinking Ensure. Struggles with restrictive eating. Reports 9 months of sobriety from drugs and alcohol. Notes improvement of of mental health during this time. Endorses anxiety is still present to a significant degree. Is trying to do whatever she can to avoid medication    Has had some ER visits for physical symptoms such as chest pain, palpitations, headaches, tinnitus. One of the symptoms bothering her the most is jaw clenching and pain. Going on for months. On both sides, but worse on the R. Notices it at night when trying to go to bed and relax. Does note she clenches her mouth a lot during the day. Tried Benadryl without significant improvement. Previously tried a muscle relaxer with some benefit    Review of Systems     Current Outpatient Medications   Medication Sig Dispense Refill    albendazole (ALBENZA) 200 MG tablet Take 2 Tabs (400 mg total) by mouth every 2 weeks for 2 doses. 4 tablet 0    tiZANidine (ZANAFLEX) 2 MG tablet Take 1 tablet by mouth 3 times daily as needed (muscle pain / teeth clinching) 90 tablet 1    lamoTRIgine (LAMICTAL) 25 MG tablet Weeks 1 and 2: 25 mg once daily; Weeks 3 and 4: 50 mg once daily; Week 5: 100 mg daily 100 tablet 1    medical marijuana Take 1 each by mouth as needed.  Puffs a pen of marijuana several times a day       No current facility-administered medications for this visit. /70 (Site: Right Upper Arm, Position: Sitting, Cuff Size: Medium Adult)   Pulse 92   Resp 18   Ht 5' 2\" (1.575 m)   Wt 93 lb (42.2 kg)   SpO2 97%   BMI 17.01 kg/m²     Physical Exam    Wt Readings from Last 3 Encounters:   11/24/21 93 lb (42.2 kg)   11/16/21 92 lb 6 oz (41.9 kg)   10/25/21 91 lb 14.9 oz (41.7 kg)     BP Readings from Last 3 Encounters:   11/24/21 106/70   11/16/21 109/66   10/25/21 120/83     Assessment/Plan:  1. Moderate protein-calorie malnutrition ([address]    2. Low weight    3. Anorexia nervosa, restricting type    4. Mood disorder (HCC)  - lamoTRIgine (LAMICTAL) 25 MG tablet; Weeks 1 and 2: 25 mg once daily; Weeks 3 and 4: 50 mg once daily; Week 5: 100 mg daily  Dispense: 100 tablet; Refill: 1    5. Bilateral temporomandibular joint pain  - tiZANidine (ZANAFLEX) 2 MG tablet; Take 1 tablet by mouth 3 times daily as needed (muscle pain / teeth clinching)  Dispense: 90 tablet; Refill: 1    6. Teeth clenching  - tiZANidine (ZANAFLEX) 2 MG tablet; Take 1 tablet by mouth 3 times daily as needed (muscle pain / teeth clinching)  Dispense: 90 tablet; Refill: 1    We discussed these concerns in detail. Her GI symptoms are atypical for pinworms but we discussed it would be reasonable to treat to cover for this just in case. The problems bothering her most are teeth clenching with TMJ pain. Advised to use a mouthguard at night and use her muscle relaxer as needed    Struggling still with mood. Does have some flight of ideas and tangential speech raising possibility of BPD II. no poly romaine. Would avoid SSRI on its own. Start Lamictal and reviewed potential side effects especially potential for rare skin reaction that would warrant a phone call in stopping the medication. Return in about 2 months (around 1/24/2022) for mood f/u.

## 2021-12-23 ENCOUNTER — OFFICE VISIT (OUTPATIENT)
Dept: FAMILY MEDICINE CLINIC | Age: 31
End: 2021-12-23
Payer: COMMERCIAL

## 2021-12-23 VITALS
HEART RATE: 82 BPM | HEIGHT: 62 IN | OXYGEN SATURATION: 98 % | DIASTOLIC BLOOD PRESSURE: 62 MMHG | SYSTOLIC BLOOD PRESSURE: 98 MMHG | WEIGHT: 95 LBS | BODY MASS INDEX: 17.48 KG/M2 | RESPIRATION RATE: 18 BRPM

## 2021-12-23 DIAGNOSIS — K62.9 RECTAL ABNORMALITY: Primary | ICD-10-CM

## 2021-12-23 DIAGNOSIS — F41.9 ANXIETY: ICD-10-CM

## 2021-12-23 PROCEDURE — 99214 OFFICE O/P EST MOD 30 MIN: CPT | Performed by: FAMILY MEDICINE

## 2021-12-23 PROCEDURE — G8427 DOCREV CUR MEDS BY ELIG CLIN: HCPCS | Performed by: FAMILY MEDICINE

## 2021-12-23 PROCEDURE — G8419 CALC BMI OUT NRM PARAM NOF/U: HCPCS | Performed by: FAMILY MEDICINE

## 2021-12-23 PROCEDURE — 4004F PT TOBACCO SCREEN RCVD TLK: CPT | Performed by: FAMILY MEDICINE

## 2021-12-23 PROCEDURE — G8484 FLU IMMUNIZE NO ADMIN: HCPCS | Performed by: FAMILY MEDICINE

## 2021-12-23 RX ORDER — HYDROCORTISONE 25 MG/G
CREAM TOPICAL
Qty: 1 EACH | Refills: 2 | Status: SHIPPED | OUTPATIENT
Start: 2021-12-23 | End: 2022-01-10

## 2021-12-23 NOTE — PROGRESS NOTES
12/23/2021    This is a 33 y.o. female   Chief Complaint   Patient presents with    Anxiety     Follow up     Other     Pt said that she is still having the sensation that she still has the pinworms. HPI    Here for f/u for mood and anxiety  - continues to have tension and strain in her jaw, as well as the sensation in her rectum that she has pinworms. At our last visit, she was treated with albendazole to cover for this in case they were present. She doesn't typically have itching.  - total, this has been going on for about a year or so    The abnormal sensation will be present intermittently, and some days are worse than others. Feels like tingling   - no significant change in BMs  - tried a muscle relaxer without any benefit    Seeing Gyn soon for some separate issues     Sober for 10 months. Has been off suboxone since that time as well    Review of Systems     Current Outpatient Medications   Medication Sig Dispense Refill    hydrocortisone (ANUSOL-HC) 2.5 % CREA rectal cream Apply topically BID for no more than 10 consecutive days 1 each 2    tiZANidine (ZANAFLEX) 2 MG tablet Take 1 tablet by mouth 3 times daily as needed (muscle pain / teeth clinching) 90 tablet 1    lamoTRIgine (LAMICTAL) 25 MG tablet Weeks 1 and 2: 25 mg once daily; Weeks 3 and 4: 50 mg once daily; Week 5: 100 mg daily 100 tablet 1    medical marijuana Take 1 each by mouth as needed. Puffs a pen of marijuana several times a day       No current facility-administered medications for this visit.      BP 98/62 (Site: Right Upper Arm, Position: Sitting, Cuff Size: Medium Adult)   Pulse 82   Resp 18   Ht 5' 2\" (1.575 m)   Wt 95 lb (43.1 kg)   SpO2 98%   BMI 17.38 kg/m²     Physical Exam  Genitourinary:     Comments: Mary Oglesby served as chaperone  No external anal abnormalities, digital rectal exam without any palpable abnormalities        Wt Readings from Last 3 Encounters:   12/23/21 95 lb (43.1 kg)   11/24/21 93 lb (42.2 kg) 11/16/21 92 lb 6 oz (41.9 kg)     BP Readings from Last 3 Encounters:   12/23/21 98/62   11/24/21 106/70   11/16/21 109/66     Assessment/Plan:  1. Rectal abnormality  - hydrocortisone (ANUSOL-HC) 2.5 % CREA rectal cream; Apply topically BID for no more than 10 consecutive days  Dispense: 1 each; Refill: 2    2. Anxiety    Her exam today is normal.  She has a sensation of things moving around the area of her rectum. Previously treated for pinworms without improvement of symptoms. She can trial topical steroids. We did discuss that this very well may be related to anxiety and discussed some cognitive behavioral techniques to specifically address this. Specifically, trying to limit avoidance behavior, acknowledging the symptom in an attempt to down regulate its effect.     Time spent: 35 minutes

## 2022-01-10 ENCOUNTER — TELEMEDICINE (OUTPATIENT)
Dept: FAMILY MEDICINE CLINIC | Age: 32
End: 2022-01-10
Payer: COMMERCIAL

## 2022-01-10 DIAGNOSIS — F60.3 BORDERLINE PERSONALITY DISORDER (HCC): ICD-10-CM

## 2022-01-10 DIAGNOSIS — R63.4 WEIGHT LOSS: ICD-10-CM

## 2022-01-10 DIAGNOSIS — H04.301 INFECTION OF RIGHT LACRIMAL DUCT: Primary | ICD-10-CM

## 2022-01-10 DIAGNOSIS — F50.01 ANOREXIA NERVOSA, RESTRICTING TYPE: ICD-10-CM

## 2022-01-10 PROCEDURE — 99214 OFFICE O/P EST MOD 30 MIN: CPT | Performed by: FAMILY MEDICINE

## 2022-01-10 PROCEDURE — G8427 DOCREV CUR MEDS BY ELIG CLIN: HCPCS | Performed by: FAMILY MEDICINE

## 2022-01-10 RX ORDER — DIAPER,BRIEF,INFANT-TODD,DISP
EACH MISCELLANEOUS
Qty: 30 EACH | Refills: 11 | Status: SHIPPED | OUTPATIENT
Start: 2022-01-10

## 2022-01-10 RX ORDER — POLYMYXIN B SULFATE AND TRIMETHOPRIM 1; 10000 MG/ML; [USP'U]/ML
2 SOLUTION OPHTHALMIC EVERY 4 HOURS
Qty: 10 ML | Refills: 3 | Status: SHIPPED | OUTPATIENT
Start: 2022-01-10 | End: 2022-01-17

## 2022-01-10 ASSESSMENT — PATIENT HEALTH QUESTIONNAIRE - PHQ9
1. LITTLE INTEREST OR PLEASURE IN DOING THINGS: 0
5. POOR APPETITE OR OVEREATING: 0
SUM OF ALL RESPONSES TO PHQ QUESTIONS 1-9: 0
8. MOVING OR SPEAKING SO SLOWLY THAT OTHER PEOPLE COULD HAVE NOTICED. OR THE OPPOSITE, BEING SO FIGETY OR RESTLESS THAT YOU HAVE BEEN MOVING AROUND A LOT MORE THAN USUAL: 0
3. TROUBLE FALLING OR STAYING ASLEEP: 0
9. THOUGHTS THAT YOU WOULD BE BETTER OFF DEAD, OR OF HURTING YOURSELF: 0
SUM OF ALL RESPONSES TO PHQ9 QUESTIONS 1 & 2: 0
4. FEELING TIRED OR HAVING LITTLE ENERGY: 0
7. TROUBLE CONCENTRATING ON THINGS, SUCH AS READING THE NEWSPAPER OR WATCHING TELEVISION: 0
2. FEELING DOWN, DEPRESSED OR HOPELESS: 0
6. FEELING BAD ABOUT YOURSELF - OR THAT YOU ARE A FAILURE OR HAVE LET YOURSELF OR YOUR FAMILY DOWN: 0
10. IF YOU CHECKED OFF ANY PROBLEMS, HOW DIFFICULT HAVE THESE PROBLEMS MADE IT FOR YOU TO DO YOUR WORK, TAKE CARE OF THINGS AT HOME, OR GET ALONG WITH OTHER PEOPLE: 0

## 2022-01-10 NOTE — PROGRESS NOTES
1/10/2022    This is a 33 y.o. female   Chief Complaint   Patient presents with    Other     Rt eye infection, puffy, sore, red     HPI    Virtual visit today for an eye issue    She reports her eye has been bothering her intermittently for the past month or two. 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 Since then, she often gets recurrent issues with drainage from her R eye  - more recently this past week her R eye has been more swollen and had more drainage.   - in the past drops have helped     Anorexia  - weight has been trending up    Mood  - struggling to take Lamictal regularly but trying, taking about 4 days per week or so    Review of Systems     Current Outpatient Medications   Medication Sig Dispense Refill    trimethoprim-polymyxin b (POLYTRIM) 92592-4.1 UNIT/ML-% ophthalmic solution Place 2 drops into the right eye every 4 hours for 7 days 10 mL 3    Eyelid Cleansers ([employer] CLEANSING EYELID WIPES) PADS Use topically daily as needed 30 each 11    lamoTRIgine (LAMICTAL) 25 MG tablet Weeks 1 and 2: 25 mg once daily; Weeks 3 and 4: 50 mg once daily; Week 5: 100 mg daily 100 tablet 1    medical marijuana Take 1 each by mouth as needed. Puffs a pen of marijuana several times a day       No current facility-administered medications for this visit. There were no vitals taken for this visit. Physical Exam    Wt Readings from Last 3 Encounters:   12/23/21 95 lb (43.1 kg)   11/24/21 93 lb (42.2 kg)   11/16/21 92 lb 6 oz (41.9 kg)     BP Readings from Last 3 Encounters:   12/23/21 98/62   11/24/21 106/70   11/16/21 109/66     Assessment/Plan:  1. 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 Treat with POlytrim drops. Discussed use of medicated eye wipes to see if this helps prevent recurrence moving forward. Has consulted with Optho in the recent past about this    2.  Anorexia nervosa, restricting type  Weight has been trending up at our visits    3. Borderline personality disorder ([facility] [address]  Currently working hard to be there for her daughter. Not in a relationship at this time which she reports can be challenging for her. Trying to take Lamictal with some regularity to help with mood fluctuations. Manju Angelia, was evaluated through a synchronous (real-time) audio-video encounter. The patient (or guardian if applicable) is aware that this is a billable service. Verbal consent to proceed has been obtained within the past 12 months. The visit was conducted pursuant to the emergency declaration under the [facility], [address] [place] [address] authority and the [employer] General Act. Patient identification was verified, and a caregiver was present when appropriate. The patient was located in a state where the provider was credentialed to provide care. Total time spent for this encounter: not billed by time    --Kleber Jordan MD on 1/10/2022 at 8:33 AM    An electronic signature was used to authenticate this note.

## 2022-01-11 RX ORDER — LANSOPRAZOLE 30 MG/1
CAPSULE, DELAYED RELEASE ORAL
Qty: 30 CAPSULE | Refills: 1 | Status: SHIPPED | OUTPATIENT
Start: 2022-01-11 | End: 2022-02-11

## 2022-02-24 ENCOUNTER — OFFICE VISIT (OUTPATIENT)
Dept: FAMILY MEDICINE CLINIC | Age: 32
End: 2022-02-24
Payer: COMMERCIAL

## 2022-02-24 VITALS
OXYGEN SATURATION: 98 % | RESPIRATION RATE: 12 BRPM | BODY MASS INDEX: 17.56 KG/M2 | DIASTOLIC BLOOD PRESSURE: 70 MMHG | WEIGHT: 96 LBS | SYSTOLIC BLOOD PRESSURE: 100 MMHG | HEART RATE: 86 BPM

## 2022-02-24 DIAGNOSIS — F41.9 ANXIETY: Primary | ICD-10-CM

## 2022-02-24 DIAGNOSIS — R56.9 SEIZURE-LIKE ACTIVITY (HCC): ICD-10-CM

## 2022-02-24 DIAGNOSIS — Z86.19 H/O COLD SORES: ICD-10-CM

## 2022-02-24 DIAGNOSIS — H57.89 EYE IRRITATION: ICD-10-CM

## 2022-02-24 PROCEDURE — G8484 FLU IMMUNIZE NO ADMIN: HCPCS | Performed by: FAMILY MEDICINE

## 2022-02-24 PROCEDURE — 99213 OFFICE O/P EST LOW 20 MIN: CPT | Performed by: FAMILY MEDICINE

## 2022-02-24 PROCEDURE — G8427 DOCREV CUR MEDS BY ELIG CLIN: HCPCS | Performed by: FAMILY MEDICINE

## 2022-02-24 PROCEDURE — 4004F PT TOBACCO SCREEN RCVD TLK: CPT | Performed by: FAMILY MEDICINE

## 2022-02-24 PROCEDURE — G8419 CALC BMI OUT NRM PARAM NOF/U: HCPCS | Performed by: FAMILY MEDICINE

## 2022-02-24 RX ORDER — ERYTHROMYCIN 5 MG/G
OINTMENT OPHTHALMIC
Qty: 1 EACH | Refills: 1 | Status: SHIPPED | OUTPATIENT
Start: 2022-02-24 | End: 2022-03-06

## 2022-02-24 RX ORDER — LAMOTRIGINE 100 MG/1
100 TABLET ORAL DAILY
Qty: 90 TABLET | Refills: 1 | Status: SHIPPED | OUTPATIENT
Start: 2022-02-24

## 2022-02-24 RX ORDER — VALACYCLOVIR HYDROCHLORIDE 500 MG/1
500 TABLET, FILM COATED ORAL DAILY
Qty: 90 TABLET | Refills: 1 | Status: SHIPPED | OUTPATIENT
Start: 2022-02-24

## 2022-02-24 NOTE — PROGRESS NOTES
2/24/2022    This is a 33 y.o. female   Chief Complaint   Patient presents with    Anxiety    Other     eye infection in Rt eye, not improved     HPI    Eye problem  - notes swelling and fulness in R lower eyelid. Has tried topical eye drops, medicated eye wipes, with improvement but without complete resolution. No visual changes or eye redness    Mood  - has titrated up to 100mg without side effects. Unsure if it has helped mood. 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 Sleep schedule has really improved notably with the Lamictal.  Has noted in the past that the diazepam's have been helpful for her anxiety and significantly help sensation of panic that she struggles with. Review of Systems     Current Outpatient Medications   Medication Sig Dispense Refill    lamoTRIgine (LAMICTAL) 100 MG tablet Take 1 tablet by mouth daily 90 tablet 1    valACYclovir (VALTREX) 500 MG tablet Take 1 tablet by mouth daily 90 tablet 1    erythromycin (ROMYCIN) 5 MG/GM ophthalmic ointment 1 ribbon (1gm) TID PRN eye irritation for up to 10 days 1 each 1    Eyelid Cleansers ([employer] CLEANSING EYELID WIPES) PADS Use topically daily as needed 30 each 11    lansoprazole (PREVACID) 30 MG delayed release capsule TAKE 1 CAPSULE BY MOUTH EVERY DAY 30 capsule 1    medical marijuana Take 1 each by mouth as needed. Puffs a pen of marijuana several times a day       No current facility-administered medications for this visit. /70 (Site: Right Upper Arm, Position: Sitting, Cuff Size: Small Adult)   Pulse 86   Resp 12   Wt 96 lb (43.5 kg)   SpO2 98%   BMI 17.56 kg/m²     Physical Exam    Wt Readings from Last 3 Encounters:   02/24/22 96 lb (43.5 kg)   12/23/21 95 lb (43.1 kg)   11/24/21 93 lb (42.2 kg)       BP Readings from Last 3 Encounters:   02/24/22 100/70   12/23/21 98/62   11/24/21 106/70     Assessment/Plan:  1. Anxiety  Continue Lamictal.  I am referring her to psychiatry to help further manage medications.   Overall her anxiety has improved  - lamoTRIgine (LAMICTAL) 100 MG tablet; Take 1 tablet by mouth daily  Dispense: 90 tablet; Refill: 1  - External Referral to Psychiatry    2. Seizure-like activity ([address]  This has not been an issue recently. 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 H/O cold sores  Daily suppression with Valtrex due to common recurrence  - valACYclovir (VALTREX) 500 MG tablet; Take 1 tablet by mouth daily  Dispense: 90 tablet; Refill: 1    4. Eye irritation  No globe irritation or erythema. She does have some irritation of the lower eyelid. Prescribing erythromycin. If not improving would consider seeing ophthalmology  - erythromycin (ROMYCIN) 5 MG/GM ophthalmic ointment; 1 ribbon (1gm) TID PRN eye irritation for up to 10 days  Dispense: 1 each;  Refill: 1

## 2022-03-09 DIAGNOSIS — R63.4 WEIGHT LOSS: ICD-10-CM

## 2022-03-09 RX ORDER — LANSOPRAZOLE 30 MG/1
CAPSULE, DELAYED RELEASE ORAL
Qty: 90 CAPSULE | Refills: 0 | Status: SHIPPED | OUTPATIENT
Start: 2022-03-09

## 2022-08-26 DIAGNOSIS — Z86.19 H/O COLD SORES: ICD-10-CM

## 2022-08-26 DIAGNOSIS — F41.9 ANXIETY: ICD-10-CM

## 2022-08-26 RX ORDER — VALACYCLOVIR HYDROCHLORIDE 500 MG/1
TABLET, FILM COATED ORAL
Qty: 90 TABLET | Refills: 1 | OUTPATIENT
Start: 2022-08-26

## 2022-08-26 RX ORDER — LAMOTRIGINE 100 MG/1
TABLET ORAL
Qty: 90 TABLET | Refills: 1 | OUTPATIENT
Start: 2022-08-26